# Patient Record
Sex: MALE | Race: WHITE | NOT HISPANIC OR LATINO | Employment: OTHER | URBAN - METROPOLITAN AREA
[De-identification: names, ages, dates, MRNs, and addresses within clinical notes are randomized per-mention and may not be internally consistent; named-entity substitution may affect disease eponyms.]

---

## 2017-01-19 RX ORDER — ESCITALOPRAM OXALATE 10 MG/1
TABLET ORAL
Qty: 30 TABLET | Refills: 6 | Status: SHIPPED | OUTPATIENT
Start: 2017-01-19 | End: 2017-08-17 | Stop reason: SDUPTHER

## 2017-05-29 ENCOUNTER — LAB VISIT (OUTPATIENT)
Dept: LAB | Facility: HOSPITAL | Age: 82
End: 2017-05-29
Attending: INTERNAL MEDICINE
Payer: MEDICARE

## 2017-05-29 DIAGNOSIS — E78.5 HYPERLIPIDEMIA ASSOCIATED WITH TYPE 2 DIABETES MELLITUS: ICD-10-CM

## 2017-05-29 DIAGNOSIS — Z00.00 ROUTINE GENERAL MEDICAL EXAMINATION AT A HEALTH CARE FACILITY: ICD-10-CM

## 2017-05-29 DIAGNOSIS — E11.69 HYPERLIPIDEMIA ASSOCIATED WITH TYPE 2 DIABETES MELLITUS: ICD-10-CM

## 2017-05-29 DIAGNOSIS — E11.9 TYPE 2 DIABETES MELLITUS WITHOUT COMPLICATION: ICD-10-CM

## 2017-05-29 LAB
ALBUMIN SERPL BCP-MCNC: 3.6 G/DL
ALP SERPL-CCNC: 94 U/L
ALT SERPL W/O P-5'-P-CCNC: 15 U/L
ANION GAP SERPL CALC-SCNC: 8 MMOL/L
AST SERPL-CCNC: 18 U/L
BASOPHILS # BLD AUTO: 0.03 K/UL
BASOPHILS NFR BLD: 0.5 %
BILIRUB SERPL-MCNC: 1.4 MG/DL
BUN SERPL-MCNC: 11 MG/DL
CALCIUM SERPL-MCNC: 8.9 MG/DL
CHLORIDE SERPL-SCNC: 106 MMOL/L
CHOLEST/HDLC SERPL: 2.9 {RATIO}
CO2 SERPL-SCNC: 27 MMOL/L
CREAT SERPL-MCNC: 0.9 MG/DL
DIFFERENTIAL METHOD: ABNORMAL
EOSINOPHIL # BLD AUTO: 0.2 K/UL
EOSINOPHIL NFR BLD: 3 %
ERYTHROCYTE [DISTWIDTH] IN BLOOD BY AUTOMATED COUNT: 13.1 %
EST. GFR  (AFRICAN AMERICAN): >60 ML/MIN/1.73 M^2
EST. GFR  (NON AFRICAN AMERICAN): >60 ML/MIN/1.73 M^2
GLUCOSE SERPL-MCNC: 119 MG/DL
HCT VFR BLD AUTO: 38.5 %
HDL/CHOLESTEROL RATIO: 34.5 %
HDLC SERPL-MCNC: 119 MG/DL
HDLC SERPL-MCNC: 41 MG/DL
HGB BLD-MCNC: 12.7 G/DL
LDLC SERPL CALC-MCNC: 51.4 MG/DL
LYMPHOCYTES # BLD AUTO: 1.6 K/UL
LYMPHOCYTES NFR BLD: 24.1 %
MCH RBC QN AUTO: 31.7 PG
MCHC RBC AUTO-ENTMCNC: 33 %
MCV RBC AUTO: 96 FL
MONOCYTES # BLD AUTO: 0.5 K/UL
MONOCYTES NFR BLD: 7 %
NEUTROPHILS # BLD AUTO: 4.2 K/UL
NEUTROPHILS NFR BLD: 65.2 %
NONHDLC SERPL-MCNC: 78 MG/DL
PLATELET # BLD AUTO: 161 K/UL
PMV BLD AUTO: 10.6 FL
POTASSIUM SERPL-SCNC: 4.3 MMOL/L
PROT SERPL-MCNC: 6.3 G/DL
RBC # BLD AUTO: 4.01 M/UL
SODIUM SERPL-SCNC: 141 MMOL/L
TRIGL SERPL-MCNC: 133 MG/DL
WBC # BLD AUTO: 6.43 K/UL

## 2017-05-29 PROCEDURE — 85025 COMPLETE CBC W/AUTO DIFF WBC: CPT

## 2017-05-29 PROCEDURE — 36415 COLL VENOUS BLD VENIPUNCTURE: CPT | Mod: PO

## 2017-05-29 PROCEDURE — 83036 HEMOGLOBIN GLYCOSYLATED A1C: CPT

## 2017-05-29 PROCEDURE — 80053 COMPREHEN METABOLIC PANEL: CPT

## 2017-05-29 PROCEDURE — 80061 LIPID PANEL: CPT

## 2017-05-30 LAB
ESTIMATED AVG GLUCOSE: 123 MG/DL
HBA1C MFR BLD HPLC: 5.9 %

## 2017-06-08 ENCOUNTER — PATIENT OUTREACH (OUTPATIENT)
Dept: ADMINISTRATIVE | Facility: HOSPITAL | Age: 82
End: 2017-06-08

## 2017-06-12 ENCOUNTER — OFFICE VISIT (OUTPATIENT)
Dept: OPHTHALMOLOGY | Facility: CLINIC | Age: 82
End: 2017-06-12
Payer: MEDICARE

## 2017-06-12 DIAGNOSIS — E11.9 DIABETES MELLITUS TYPE 2 WITHOUT RETINOPATHY: Primary | ICD-10-CM

## 2017-06-12 DIAGNOSIS — H35.3190 MACULAR DEGENERATION, AGE RELATED, NONEXUDATIVE: ICD-10-CM

## 2017-06-12 DIAGNOSIS — Z96.1 PSEUDOPHAKIA, BOTH EYES: ICD-10-CM

## 2017-06-12 PROCEDURE — 99999 PR PBB SHADOW E&M-EST. PATIENT-LVL II: CPT | Mod: PBBFAC,,, | Performed by: OPHTHALMOLOGY

## 2017-06-12 PROCEDURE — 92014 COMPRE OPH EXAM EST PT 1/>: CPT | Mod: S$GLB,,, | Performed by: OPHTHALMOLOGY

## 2017-06-12 PROCEDURE — 99499 UNLISTED E&M SERVICE: CPT | Mod: S$GLB,,, | Performed by: OPHTHALMOLOGY

## 2017-06-12 NOTE — PROGRESS NOTES
SUBJECTIVE:   Wilder Alejandra is a 89 y.o. male   Corrected distance visual acuity was 20/20 -2 in the right eye and 20/20 -2 in the left eye.   Chief Complaint   Patient presents with    Diabetic Eye Exam     pt states here for yearly DFE.pt states no complaints va is good no need for new RX    Macular Degeneration        HPI:  HPI     Diabetic Eye Exam    Additional comments: pt states here for yearly DFE.pt states no   complaints va is good no need for new RX           Comments     Am reading      1. DM x 2000  2. Mild Dry AMD  3. PCIOL OU before 2003  YAG OD 9/23/04  YAG OS before 2003  4. H/o Diplopia (esotropia)       Last edited by Caterina Ferguson MA on 6/12/2017  8:03 AM. (History)        Assessment /Plan :  1. Diabetes mellitus type 2 without retinopathy No diabetic retinopathy at this time. Reviewed diabetic eye precautions including avoiding tobacco use,  Good glucose control, and importance of regular follow up.    2. Macular degeneration, age related, nonexudative - Both Eyes stable   3. Pseudophakia, both eyes stable     RTC in 1 year or prn any changes

## 2017-06-21 ENCOUNTER — OFFICE VISIT (OUTPATIENT)
Dept: INTERNAL MEDICINE | Facility: CLINIC | Age: 82
End: 2017-06-21
Payer: MEDICARE

## 2017-06-21 VITALS
BODY MASS INDEX: 22.12 KG/M2 | TEMPERATURE: 96 F | DIASTOLIC BLOOD PRESSURE: 74 MMHG | HEART RATE: 69 BPM | OXYGEN SATURATION: 96 % | HEIGHT: 68 IN | WEIGHT: 145.94 LBS | SYSTOLIC BLOOD PRESSURE: 118 MMHG

## 2017-06-21 DIAGNOSIS — E11.59 HYPERTENSION ASSOCIATED WITH DIABETES: Chronic | ICD-10-CM

## 2017-06-21 DIAGNOSIS — I15.2 HYPERTENSION ASSOCIATED WITH DIABETES: Chronic | ICD-10-CM

## 2017-06-21 DIAGNOSIS — M81.0 OSTEOPOROSIS, UNSPECIFIED OSTEOPOROSIS TYPE, UNSPECIFIED PATHOLOGICAL FRACTURE PRESENCE: ICD-10-CM

## 2017-06-21 DIAGNOSIS — I77.1 TORTUOUS AORTA: ICD-10-CM

## 2017-06-21 DIAGNOSIS — Z00.00 ROUTINE GENERAL MEDICAL EXAMINATION AT A HEALTH CARE FACILITY: Primary | ICD-10-CM

## 2017-06-21 DIAGNOSIS — E11.9 TYPE 2 DIABETES MELLITUS WITHOUT COMPLICATION, WITHOUT LONG-TERM CURRENT USE OF INSULIN: ICD-10-CM

## 2017-06-21 DIAGNOSIS — E78.5 HYPERLIPIDEMIA ASSOCIATED WITH TYPE 2 DIABETES MELLITUS: Chronic | ICD-10-CM

## 2017-06-21 DIAGNOSIS — E11.69 HYPERLIPIDEMIA ASSOCIATED WITH TYPE 2 DIABETES MELLITUS: Chronic | ICD-10-CM

## 2017-06-21 PROCEDURE — 99499 UNLISTED E&M SERVICE: CPT | Mod: S$GLB,,, | Performed by: INTERNAL MEDICINE

## 2017-06-21 PROCEDURE — 99999 PR PBB SHADOW E&M-EST. PATIENT-LVL III: CPT | Mod: PBBFAC,,, | Performed by: INTERNAL MEDICINE

## 2017-06-21 PROCEDURE — 99397 PER PM REEVAL EST PAT 65+ YR: CPT | Mod: S$GLB,,, | Performed by: INTERNAL MEDICINE

## 2017-06-21 NOTE — PROGRESS NOTES
"Subjective:      Patient ID: Wilder Alejandra is a 89 y.o. male.    Chief Complaint: Annual Exam    89 yo with Patient Active Problem List:     Refractive error     Type 2 diabetes mellitus without complication     Macular degeneration, age related, nonexudative - Both Eyes     Lens replaced by other means - Both Eyes     CAD (coronary artery disease)     Ectropion of left eye     Pseudophakia     Hyperlipidemia associated with type 2 diabetes mellitus     Hypertension associated with diabetes     Osteoporosis     Tortuous aorta     Diabetes mellitus type 2 without retinopathy     Pseudophakia, both eyes    Here today for annual prev exam.  Compliant with meds without significant side effects. Feeling in usu state of health. Appetite is good. Up to date with specialty clinics. Quit tob in his 30s.       Review of Systems   Constitutional: Negative for chills and fever.   HENT: Negative for ear pain and sore throat.    Respiratory: Negative for cough.    Cardiovascular: Negative for chest pain.   Gastrointestinal: Negative for abdominal pain and blood in stool.   Genitourinary: Negative for dysuria and hematuria.   Neurological: Negative for seizures and syncope.     Objective:   /74 (BP Location: Right arm, Patient Position: Sitting)   Pulse 69   Temp 96 °F (35.6 °C) (Tympanic)   Ht 5' 8" (1.727 m)   Wt 66.2 kg (145 lb 15.1 oz)   SpO2 96%   BMI 22.19 kg/m²     Physical Exam   Constitutional: He is oriented to person, place, and time. He appears well-developed and well-nourished. No distress.   HENT:   Head: Normocephalic and atraumatic.   Mouth/Throat: Oropharynx is clear and moist.   Eyes: EOM are normal. Pupils are equal, round, and reactive to light.   Neck: Neck supple. No thyromegaly present.   Cardiovascular: Normal rate and regular rhythm.    Pulses:       Posterior tibial pulses are 2+ on the right side, and 2+ on the left side.   Pulmonary/Chest: Breath sounds normal. He has no wheezes. He has " no rales.   Abdominal: Soft. Bowel sounds are normal. There is no tenderness.   Feet:   Right Foot:   Protective Sensation: 8 sites tested. 8 sites sensed.   Skin Integrity: Negative for ulcer or blister.   Left Foot:   Protective Sensation: 8 sites tested. 8 sites sensed.   Skin Integrity: Negative for ulcer or blister.   Lymphadenopathy:     He has no cervical adenopathy.   Neurological: He is alert and oriented to person, place, and time.   Skin: Skin is warm and dry.   Psychiatric: He has a normal mood and affect. His behavior is normal.       Assessment:     1. Routine general medical examination at a health care facility    2. Hyperlipidemia associated with type 2 diabetes mellitus    3. Hypertension associated with diabetes    4. Type 2 diabetes mellitus without complication, without long-term current use of insulin    5. Osteoporosis, unspecified osteoporosis type, unspecified pathological fracture presence    6. Tortuous aorta      Plan:   Routine general medical examination at a health care facility  Heart healthy diet and reg exercise  HM reviewed with pt    Hyperlipidemia associated with type 2 diabetes mellitus  -     Lipid panel; Future; Expected date: 06/21/2018  -     TSH; Future; Expected date: 06/21/2018    Hypertension associated with diabetes  -     Comprehensive metabolic panel; Future; Expected date: 06/21/2018  -     CBC auto differential; Future; Expected date: 06/21/2018    Type 2 diabetes mellitus without complication, without long-term current use of insulin  -     Hemoglobin A1c; Future; Expected date: 06/21/2018    Osteoporosis, unspecified osteoporosis type, unspecified pathological fracture presence  -     DXA Bone Density Spine And Hip; Future; Expected date: 06/21/2017    Tortuous aorta        1. Hypertension associated with diabetes  Stable. On Nadolol. Continue current treatment plan   2. Coronary artery disease involving native coronary artery of native heart without angina  pectoris  Stable. Continue current treatment plan as previously prescribed Dr. Montague.  3. Type 2 diabetes mellitus without complication, without long-term current use of insulin  Stable. Managed with diet, exercise. Continue current treatment plan   4. Hyperlipidemia associated with type 2 diabetes mellitus  Stable. On Atorvastatinper cards  5. Osteoporosis  Dexa 5/9/13/ . Taking Calcium, Vit D.  pt denies h/o bisphophonate. Check dxa next avail. Pt agreeable to bisphosphonate if indicated   6. Tortuous aorta  Chest x-ray 9/10/10  Stable.    7. Macular degeneration, age related, nonexudative - Both Eyes  Stable. Continue current treatment plan as previously prescribed with your ophthalmologist, Dr. Yadav.      No Follow-up on file.

## 2017-06-23 RX ORDER — LANCETS
1 EACH MISCELLANEOUS DAILY
Qty: 100 EACH | Refills: 1 | Status: SHIPPED | OUTPATIENT
Start: 2017-06-23

## 2017-06-23 RX ORDER — DEXTROSE 4 G
TABLET,CHEWABLE ORAL
Qty: 1 EACH | Refills: 0 | Status: SHIPPED | OUTPATIENT
Start: 2017-06-23 | End: 2019-09-03

## 2017-07-11 RX ORDER — ATORVASTATIN CALCIUM 80 MG/1
TABLET, FILM COATED ORAL
Qty: 30 TABLET | Refills: 5 | Status: SHIPPED | OUTPATIENT
Start: 2017-07-11 | End: 2018-07-02 | Stop reason: SDUPTHER

## 2017-07-12 ENCOUNTER — APPOINTMENT (OUTPATIENT)
Dept: RADIOLOGY | Facility: CLINIC | Age: 82
End: 2017-07-12
Attending: INTERNAL MEDICINE
Payer: MEDICARE

## 2017-07-12 DIAGNOSIS — M81.0 OSTEOPOROSIS, UNSPECIFIED OSTEOPOROSIS TYPE, UNSPECIFIED PATHOLOGICAL FRACTURE PRESENCE: ICD-10-CM

## 2017-07-12 PROCEDURE — 77080 DXA BONE DENSITY AXIAL: CPT | Mod: TC,PO

## 2017-07-12 PROCEDURE — 77080 DXA BONE DENSITY AXIAL: CPT | Mod: 26,,, | Performed by: INTERNAL MEDICINE

## 2017-08-17 RX ORDER — ESCITALOPRAM OXALATE 10 MG/1
TABLET ORAL
Qty: 90 TABLET | Refills: 1 | Status: SHIPPED | OUTPATIENT
Start: 2017-08-17 | End: 2017-09-13 | Stop reason: SDUPTHER

## 2017-08-30 ENCOUNTER — PATIENT OUTREACH (OUTPATIENT)
Dept: ADMINISTRATIVE | Facility: HOSPITAL | Age: 82
End: 2017-08-30

## 2017-09-13 ENCOUNTER — OFFICE VISIT (OUTPATIENT)
Dept: INTERNAL MEDICINE | Facility: CLINIC | Age: 82
End: 2017-09-13
Payer: MEDICARE

## 2017-09-13 VITALS
SYSTOLIC BLOOD PRESSURE: 132 MMHG | OXYGEN SATURATION: 98 % | HEART RATE: 63 BPM | HEIGHT: 68 IN | BODY MASS INDEX: 21.85 KG/M2 | DIASTOLIC BLOOD PRESSURE: 72 MMHG | WEIGHT: 144.19 LBS | TEMPERATURE: 96 F

## 2017-09-13 DIAGNOSIS — F41.9 ANXIETY: Primary | ICD-10-CM

## 2017-09-13 DIAGNOSIS — R46.89 COMPULSIVE BEHAVIORS: ICD-10-CM

## 2017-09-13 DIAGNOSIS — R46.81 OBSESSIVE BEHAVIOR: ICD-10-CM

## 2017-09-13 PROCEDURE — 99213 OFFICE O/P EST LOW 20 MIN: CPT | Mod: S$GLB,,, | Performed by: INTERNAL MEDICINE

## 2017-09-13 PROCEDURE — 99999 PR PBB SHADOW E&M-EST. PATIENT-LVL III: CPT | Mod: PBBFAC,,, | Performed by: INTERNAL MEDICINE

## 2017-09-13 PROCEDURE — 3008F BODY MASS INDEX DOCD: CPT | Mod: S$GLB,,, | Performed by: INTERNAL MEDICINE

## 2017-09-13 PROCEDURE — 1126F AMNT PAIN NOTED NONE PRSNT: CPT | Mod: S$GLB,,, | Performed by: INTERNAL MEDICINE

## 2017-09-13 PROCEDURE — 1159F MED LIST DOCD IN RCRD: CPT | Mod: S$GLB,,, | Performed by: INTERNAL MEDICINE

## 2017-09-13 RX ORDER — ESCITALOPRAM OXALATE 10 MG/1
10 TABLET ORAL DAILY
Qty: 90 TABLET | Refills: 1 | Status: SHIPPED | OUTPATIENT
Start: 2017-09-13 | End: 2019-07-17

## 2017-09-13 NOTE — PROGRESS NOTES
"Subjective:      Patient ID: Wilder Alejandra is a 89 y.o. male.    Chief Complaint: Anxiety    89 yo with Patient Active Problem List:     Refractive error     Type 2 diabetes mellitus without complication     Macular degeneration, age related, nonexudative - Both Eyes     Lens replaced by other means - Both Eyes     CAD (coronary artery disease)     Ectropion of left eye     Pseudophakia     Hyperlipidemia associated with type 2 diabetes mellitus     Hypertension associated with diabetes     Osteoporosis     Tortuous aorta     Diabetes mellitus type 2 without retinopathy     Pseudophakia, both eyes    Here today with his daughter , Rebecca, concerned about worsening anxiety. Pt states, "I make a big deal out of everything"  Worries about things that don't need to be worried about. Especially Scheduling. Pt states he has some occasional issues with short term memory.  However he continues to manage his finances well.  He had his calendar with him with accurate notations for 2 events planned for next week.  His daughter feels he is obsessive about  reviewing his schedule often. Mult times daily. He has had some compulsive behavior even in younger years.   Family states worries a lot even though he has nothing to worry about, has good finances.   Plays tennis once weekly which he enjoys.  Does not care for activities at assisted living that are available to him.  He has never been interested in these activities even in his younger days.  He denies depression.      Review of Systems   Constitutional: Negative for chills and fever.   HENT: Negative for ear pain and sore throat.    Respiratory: Negative for cough.    Cardiovascular: Negative for chest pain.   Gastrointestinal: Negative for abdominal pain and blood in stool.   Genitourinary: Negative for dysuria and hematuria.   Neurological: Negative for seizures and syncope.     Objective:   BP (!) 140/60 (BP Location: Right arm, Patient Position: Sitting)   Pulse 63   " "Temp 96.3 °F (35.7 °C) (Tympanic)   Ht 5' 8" (1.727 m)   Wt 65.4 kg (144 lb 2.9 oz)   SpO2 98%   BMI 21.92 kg/m²     Physical Exam   Constitutional: He is oriented to person, place, and time. He appears well-developed and well-nourished. No distress.   HENT:   Head: Normocephalic and atraumatic.   Mouth/Throat: Oropharynx is clear and moist.   Eyes: EOM are normal. Pupils are equal, round, and reactive to light.   Neck: Neck supple. No thyromegaly present.   Cardiovascular: Normal rate and regular rhythm.    Pulmonary/Chest: Breath sounds normal. He has no wheezes. He has no rales.   Abdominal: Soft. Bowel sounds are normal. There is no tenderness.   Musculoskeletal: He exhibits no edema.   Lymphadenopathy:     He has no cervical adenopathy.   Neurological: He is alert and oriented to person, place, and time.   Skin: Skin is warm and dry.   Psychiatric: He has a normal mood and affect. His behavior is normal.       Assessment:     1. Anxiety    2. Compulsive behaviors    3. Obsessive behavior      Plan:   Anxiety  -     escitalopram oxalate (LEXAPRO) 10 MG tablet; Take 1 tablet (10 mg total) by mouth once daily.  Dispense: 90 tablet; Refill: 1  -     Ambulatory referral to Social Work    Compulsive behaviors  -     Ambulatory referral to Social Work    Obsessive behavior  -     Ambulatory referral to Social Work        Lab Frequency Next Occurrence   Comprehensive metabolic panel Once 06/21/2018   CBC auto differential Once 06/21/2018   Hemoglobin A1c Once 06/21/2018   Lipid panel Once 06/21/2018   TSH Once 06/21/2018       Problem List Items Addressed This Visit     None      Visit Diagnoses     Anxiety    -  Primary    Relevant Medications    escitalopram oxalate (LEXAPRO) 10 MG tablet    Other Relevant Orders    Ambulatory referral to Social Work    Compulsive behaviors        Relevant Orders    Ambulatory referral to Social Work    Obsessive behavior        Relevant Orders    Ambulatory referral to Social " Work          Return in about 3 months (around 12/13/2017), or if symptoms worsen or fail to improve.

## 2017-10-25 ENCOUNTER — OFFICE VISIT (OUTPATIENT)
Dept: INTERNAL MEDICINE | Facility: CLINIC | Age: 82
End: 2017-10-25
Payer: MEDICARE

## 2017-10-25 VITALS
BODY MASS INDEX: 21.75 KG/M2 | OXYGEN SATURATION: 93 % | WEIGHT: 143.5 LBS | DIASTOLIC BLOOD PRESSURE: 72 MMHG | HEIGHT: 68 IN | SYSTOLIC BLOOD PRESSURE: 127 MMHG | HEART RATE: 68 BPM

## 2017-10-25 DIAGNOSIS — R46.89 COMPULSIVE BEHAVIOR: ICD-10-CM

## 2017-10-25 DIAGNOSIS — E78.5 HYPERLIPIDEMIA ASSOCIATED WITH TYPE 2 DIABETES MELLITUS: Chronic | ICD-10-CM

## 2017-10-25 DIAGNOSIS — E11.9 DIABETES MELLITUS TYPE 2 WITHOUT RETINOPATHY: ICD-10-CM

## 2017-10-25 DIAGNOSIS — H35.3190 MACULAR DEGENERATION, AGE RELATED, NONEXUDATIVE: ICD-10-CM

## 2017-10-25 DIAGNOSIS — I25.10 CORONARY ARTERY DISEASE INVOLVING NATIVE CORONARY ARTERY OF NATIVE HEART WITHOUT ANGINA PECTORIS: ICD-10-CM

## 2017-10-25 DIAGNOSIS — I77.1 TORTUOUS AORTA: ICD-10-CM

## 2017-10-25 DIAGNOSIS — F41.9 ANXIETY: ICD-10-CM

## 2017-10-25 DIAGNOSIS — I15.2 HYPERTENSION ASSOCIATED WITH DIABETES: Chronic | ICD-10-CM

## 2017-10-25 DIAGNOSIS — M81.0 OSTEOPOROSIS, UNSPECIFIED OSTEOPOROSIS TYPE, UNSPECIFIED PATHOLOGICAL FRACTURE PRESENCE: ICD-10-CM

## 2017-10-25 DIAGNOSIS — E11.69 HYPERLIPIDEMIA ASSOCIATED WITH TYPE 2 DIABETES MELLITUS: Chronic | ICD-10-CM

## 2017-10-25 DIAGNOSIS — E11.59 HYPERTENSION ASSOCIATED WITH DIABETES: Chronic | ICD-10-CM

## 2017-10-25 DIAGNOSIS — Z00.00 ENCOUNTER FOR PREVENTIVE HEALTH EXAMINATION: Primary | ICD-10-CM

## 2017-10-25 DIAGNOSIS — H91.93 BILATERAL HEARING LOSS, UNSPECIFIED HEARING LOSS TYPE: ICD-10-CM

## 2017-10-25 PROCEDURE — G0439 PPPS, SUBSEQ VISIT: HCPCS | Mod: S$GLB,,, | Performed by: NURSE PRACTITIONER

## 2017-10-25 PROCEDURE — 99499 UNLISTED E&M SERVICE: CPT | Mod: S$GLB,,, | Performed by: NURSE PRACTITIONER

## 2017-10-25 PROCEDURE — 99999 PR PBB SHADOW E&M-EST. PATIENT-LVL IV: CPT | Mod: PBBFAC,,, | Performed by: NURSE PRACTITIONER

## 2017-10-25 NOTE — PROGRESS NOTES
"Wilder Alejandra presented for a  Medicare AWV and comprehensive Health Risk Assessment today. The following components were reviewed and updated:    · Medical history  · Family History  · Social history  · Allergies and Current Medications  · Health Risk Assessment  · Health Maintenance  · Care Team     ** See Completed Assessments for Annual Wellness Visit within the encounter summary.**       The following assessments were completed:  · Living Situation  · CAGE  · Depression Screening  · Timed Get Up and Go  · Whisper Test  · Cognitive Function Screening  · Nutrition Screening  · ADL Screening  · PAQ Screening    Vitals:    10/25/17 0950   BP: 127/72   BP Location: Left arm   Patient Position: Sitting   BP Method: Medium (Manual)   Pulse: 68   SpO2: (!) 93%   Weight: 65.1 kg (143 lb 8.3 oz)   Height: 5' 8" (1.727 m)     Body mass index is 21.82 kg/m².  Physical Exam   Constitutional: He appears well-developed. No distress.   HENT:   Head: Normocephalic and atraumatic.   Eyes: Pupils are equal, round, and reactive to light.       Neck: Carotid bruit is not present.   Cardiovascular: Normal rate, regular rhythm, normal heart sounds, intact distal pulses and normal pulses.  Exam reveals no gallop.    No murmur heard.  Pulmonary/Chest: Effort normal and breath sounds normal.   Abdominal: Soft. Bowel sounds are normal. He exhibits no distension. There is no tenderness.   Musculoskeletal: Normal range of motion. He exhibits no edema.   Neurological: He exhibits normal muscle tone. Gait normal.   Skin: Skin is warm, dry and intact.   Psychiatric: He has a normal mood and affect. His speech is normal and behavior is normal. Judgment and thought content normal. Cognition and memory are normal.   Nursing note and vitals reviewed.        Diagnoses and health risks identified today and associated recommendations/orders:    1. Encounter for preventive health examination    2. Coronary artery disease involving native coronary " artery of native heart without angina pectoris  S/P PTCA stent 1 yrs ago . Stable and controlled on Aspirin And Lipitor daily .Denies chest pain . Continue current treatment plan as previously prescribed with your cardiologist -.    3. Hypertension associated with diabetes  Stable and controlled on Nadolol daily. Continue current treatment plan as previously prescribed with your PCP- Jovon and cardiologist     4. Hyperlipidemia associated with type 2 diabetes mellitus  Component      Latest Ref Rng & Units 5/29/2017   Cholesterol      120 - 199 mg/dL 119 (L)   Triglycerides      30 - 150 mg/dL 133   HDL      40 - 75 mg/dL 41   LDL Cholesterol      63.0 - 159.0 mg/dL 51.4 (L)   HDL/Chol Ratio      20.0 - 50.0 % 34.5   Total Cholesterol/HDL Ratio      2.0 - 5.0 2.9   Stable and controlled on Lipitor daily . Continue current treatment plan as previously prescribed with your PCP and cardiologist .    5. Diabetes mellitus type 2 without retinopathy  Component      Latest Ref Rng & Units 5/29/2017 5/31/2016 6/9/2015 5/19/2014   Hemoglobin A1C      4.5 - 6.2 % 5.9 5.9 5.9 6.0     Component      Latest Ref Rng & Units 5/9/2013   Hemoglobin A1C      4.5 - 6.2 % 6.0   Stable and controlled on diet modifications and exercise . Continue current treatment plan as previously prescribed with your PCP.     6. Macular degeneration, age related, non exudative - Both Eyes  Chronic and stable vision .Continue current treatment plan as previously prescribed with your optholomogist .    7. Tortuous aorta   Chest xray  9/10/2010 THE AORTA IS MILDLY TORTUOUS.    Stable and controlled blood pressure and cholesterol level .Continue current treatment plan as previously prescribed with your PCP.     8. Osteoporosis, unspecified osteoporosis type, unspecified pathological fracture presence  DEXA 7/12/ 2017 due again in  2 yrs This problem is currently not controlled. Pt is not on a treatment plan. Please follow up with your PCP as  planned to discuss adjustments to your treatment plan.    9. Bilateral hearing loss, unspecified hearing loss type  Stable and controlled with  Bilateral hearing aides . Continue current treatment plan as previously prescribed with your PCP.     10 Anxiety  Stable and controlled on Lexapro- states anxiety decreased . Continue current treatment plan as previously prescribed with your PCP.     11. Compulsive behavior  Stable and controlled on Lexapro- states he does not over think anymore. Continue current treatment plan as previously prescribed with your PCP.       Provided Wilder with a 5-10 year written screening schedule and personal prevention plan. Recommendations were developed using the USPSTF age appropriate recommendations. Education, counseling, and referrals were provided as needed. After Visit Summary printed and given to patient which includes a list of additional screenings\tests needed.    1 Yearly  Follow   Melinda Louis NP

## 2017-10-25 NOTE — PATIENT INSTRUCTIONS
Counseling and Referral of Other Preventative  (Italic type indicates deductible and co-insurance are waived)    Patient Name: Wilder Alejandra  Today's Date: 10/25/2017      SERVICE LIMITATIONS RECOMMENDATION    Vaccines    · Pneumococcal (once after 65)    · Influenza (annually)    · Hepatitis B (if medium/high risk)    · Prevnar 13      Hepatitis B medium/high risk factors:       - End-stage renal disease       - Hemophiliacs who received Factor VII or         IX concentrates       - Clients of institutions for the mentally             retarded       - Persons who live in the same house as          a HepB carrier       - Homosexual men       - Illicit injectable drug abusers     Pneumococcal: Done, no repeat necessary     Influenza: Done, repeat in one year     Hepatitis B: N/A     Prevnar 13: Done, no repeat necessary    Prostate cancer screening (annually to age 75)     Prostate specific antigen (PSA) Shared decision making with Provider. Sometimes a co-pay may be required if the patient decides to have this test. The USPSTF no longer recommends prostate cancer screening routinely in medicine: not to follow    Colorectal cancer screening (to age 75)    · Fecal occult blood test (annual)  · Flexible sigmoidoscopy (5y)  · Screening colonoscopy (10y)  · Barium enema   N/A    Diabetes self-management training (no USPSTF recommendations)  Requires referral by treating physician for patient with diabetes or renal disease. 10 hours of initial DSMT sessions of no less than 30 minutes each in a continuous 12-month period. 2 hours of follow-up DSMT in subsequent years.  Recommended to patient, declined    Glaucoma screening (no USPSTF recommendation)  Diabetes mellitus, family history   , age 50 or over    American, age 65 or over  Done this year, repeat every year    Medical nutrition therapy for diabetes or renal disease (no recommended schedule)  Requires referral by treating physician for  patient with diabetes or renal disease or kidney transplant within the past 3 years.  Can be provided in same year as diabetes self-management training (DSMT), and CMS recommends medical nutrition therapy take place after DSMT. Up to 3 hours for initial year and 2 hours in subsequent years.  Recommended to patient, declined    Cardiovascular screening blood tests (every 5 years)  · Fasting lipid panel  Order as a panel if possible  Done this year, repeat every year    Diabetes screening tests (at least every 3 years, Medicare covers annually or at 6-month intervals for prediabetic patients)  · Fasting blood sugar (FBS) or glucose tolerance test (GTT)  Patient must be diagnosed with one of the following:       - Hypertension       - Dyslipidemia       - Obesity (BMI 30kg/m2)       - Previous elevated impaired FBS or GTT       ... or any two of the following:       - Overweight (BMI 25 but <30)       - Family history of diabetes       - Age 65 or older       - History of gestational diabetes or birth of baby weighing more than 9 pounds  Done this year, repeat every year    Abdominal aortic aneurysm screening (once)  · Sonogram   Limited to patients who meet one of the following criteria:       - Men who are 65-75 years old and have smoked more than 100 cigarette in their lifetime       - Anyone with a family history of abdominal aortic aneurysm       - Anyone recommended for screening by the USPSTF  N/A    HIV screening (annually for increased risk patients)  · HIV-1 and HIV-2 by EIA, or REJI, rapid antibody test or oral mucosa transudate  Patients must be at increased risk for HIV infection per USPSTF guidelines or pregnant. Tests covered annually for patient at increased risk or as requested by the patient. Pregnant patients may receive up to 3 tests during pregnancy.  Risks discussed, screening is not recommended    Smoking cessation counseling (up to 8 sessions per year)  Patients must be asymptomatic of  tobacco-related conditions to receive as a preventative service.  Non-smoker    Subsequent annual wellness visit  At least 12 months since last AWV  Return in one year     The following information is provided to all patients.  This information is to help you find resources for any of the problems found today that may be affecting your health:                Living healthy guide: www.[facility].[place].[facility]      Understanding Diabetes: www.diabetes.org      Eating healthy: www.cdc.gov/healthyweight      CDC home safety checklist: www.cdc.gov/steadi/patient.html      Agency on Aging: www.goea.[place].[facility]      Alcoholics anonymous (AA): www.aa.org      Physical Activity: www.venus.nih.gov/jc8etrp      Tobacco use: www.quitwithusla.org

## 2017-10-25 NOTE — Clinical Note
Your patient was seen today for a HRA visit.  I have included a copy of my visit note, please review the note and feel free to contact me with any questions.  Thank you for allowing me to participate in the care of your patients.  Melinda Louis NP

## 2017-10-25 NOTE — PROGRESS NOTES
"Wilder Alejandra presented for a  Medicare AWV and comprehensive Health Risk Assessment today. The following components were reviewed and updated:    · Medical history  · Family History  · Social history  · Allergies and Current Medications  · Health Risk Assessment  · Health Maintenance  · Care Team     ** See Completed Assessments for Annual Wellness Visit within the encounter summary.**       The following assessments were completed:  · Living Situation  · CAGE  · Depression Screening  · Timed Get Up and Go  · Whisper Test  · Cognitive Function Screening  · Nutrition Screening  · ADL Screening  · PAQ Screening    Vitals:    10/25/17 0950   BP: 127/72   BP Location: Left arm   Patient Position: Sitting   BP Method: Medium (Manual)   Pulse: 68   SpO2: (!) 93%   Weight: 65.1 kg (143 lb 8.3 oz)   Height: 5' 8" (1.727 m)     Body mass index is 21.82 kg/m².  Physical Exam      Diagnoses and health risks identified today and associated recommendations/orders:    1. Encounter for preventive health examination  ***    2. Coronary artery disease involving native coronary artery of native heart without angina pectoris  ***    3. Hypertension associated with diabetes  ***    4. Hyperlipidemia associated with type 2 diabetes mellitus  ***    5. Diabetes mellitus type 2 without retinopathy  ***    6. Macular degeneration, age related, nonexudative - Both Eyes  ***    7. Tortuous aorta  ***    8. Osteoporosis, unspecified osteoporosis type, unspecified pathological fracture presence  ***      Provided Wilder with a 5-10 year written screening schedule and personal prevention plan. Recommendations were developed using the USPSTF age appropriate recommendations. Education, counseling, and referrals were provided as needed. After Visit Summary printed and given to patient which includes a list of additional screenings\tests needed.    No Follow-up on file.    Melinda Louis NP  "

## 2017-10-25 NOTE — Clinical Note
Your patient was seen today for a HRA visit.  have included a copy of my visit note, please review the note and feel free to contact me with any questions.  Thank you for allowing me to participate in the care of your patients.  Melinda Louis NP

## 2017-11-29 ENCOUNTER — PATIENT OUTREACH (OUTPATIENT)
Dept: ADMINISTRATIVE | Facility: HOSPITAL | Age: 82
End: 2017-11-29

## 2017-11-29 ENCOUNTER — TELEPHONE (OUTPATIENT)
Dept: INTERNAL MEDICINE | Facility: CLINIC | Age: 82
End: 2017-11-29

## 2017-11-29 NOTE — TELEPHONE ENCOUNTER
----- Message from Delmis Sandoval sent at 11/29/2017  9:39 AM CST -----  Contact: pt  Please call pt @ [phone] regarding medication.

## 2017-11-29 NOTE — TELEPHONE ENCOUNTER
Pt stated he needs a refill of Lexapro 10 mg.  Notified pt that he is not due for a refill until at least 12/13/17 and that the refill is already on file at [facility].  Pt stated he tried calling [facility] to have them refill script and was told they could not.  I spoke with the pharmacist, Titus, who stated pt just picked up a 3-month supply of med on 10/31/17.  Notified pt of info and pt stated he only has 4 tabs left in bottle.  Asked pt if he has someone at home who helps to manage his meds.  Pt stated no.  Advised pt that he can request to  script early from pharmacy but that he will most likely have to pay out of pocket for it since it is being picked up too early.  Pt verbalized understanding.

## 2017-12-13 ENCOUNTER — OFFICE VISIT (OUTPATIENT)
Dept: INTERNAL MEDICINE | Facility: CLINIC | Age: 82
End: 2017-12-13
Payer: MEDICARE

## 2017-12-13 VITALS
DIASTOLIC BLOOD PRESSURE: 68 MMHG | TEMPERATURE: 96 F | HEIGHT: 68 IN | OXYGEN SATURATION: 95 % | HEART RATE: 82 BPM | WEIGHT: 144.19 LBS | BODY MASS INDEX: 21.85 KG/M2 | SYSTOLIC BLOOD PRESSURE: 120 MMHG

## 2017-12-13 DIAGNOSIS — F41.9 ANXIETY: Primary | ICD-10-CM

## 2017-12-13 DIAGNOSIS — E11.9 DIABETES MELLITUS TYPE 2 WITHOUT RETINOPATHY: ICD-10-CM

## 2017-12-13 PROCEDURE — 99499 UNLISTED E&M SERVICE: CPT | Mod: S$GLB,,, | Performed by: INTERNAL MEDICINE

## 2017-12-13 PROCEDURE — 99213 OFFICE O/P EST LOW 20 MIN: CPT | Mod: S$GLB,,, | Performed by: INTERNAL MEDICINE

## 2017-12-13 PROCEDURE — 99999 PR PBB SHADOW E&M-EST. PATIENT-LVL IV: CPT | Mod: PBBFAC,,, | Performed by: INTERNAL MEDICINE

## 2017-12-14 NOTE — PROGRESS NOTES
"Subjective:      Patient ID: Wilder Alejandra is a 89 y.o. male.    Chief Complaint: Follow-up    89 yo with Patient Active Problem List:     Refractive error     Macular degeneration, age related, nonexudative - Both Eyes     Lens replaced by other means - Both Eyes     CAD (coronary artery disease)     Ectropion of left eye     Pseudophakia     Hyperlipidemia associated with type 2 diabetes mellitus     Hypertension associated with diabetes     Osteoporosis     Tortuous aorta     Diabetes mellitus type 2 without retinopathy     Pseudophakia, both eyes     Bilateral hearing loss     Anxiety     Compulsive behavior    Here today for follow-up on anxiety.  He reports that he is feeling well today without any new complaints.  He reports that he took Lexapro for approximately one month.  He did feel some improvement on the medication.  However he reports that his pharmacy did not give him his full prescription and therefore he did not continue the medication.  He reports that he has not had Lexapro for over one mouth and is feeling well.  He does not feel that he has any significant anxiety or significant worry.      Review of Systems   Constitutional: Negative for chills and fever.   HENT: Negative for ear pain and sore throat.    Respiratory: Negative for cough.    Cardiovascular: Negative for chest pain.   Gastrointestinal: Negative for abdominal pain and blood in stool.   Genitourinary: Negative for dysuria and hematuria.   Neurological: Negative for seizures and syncope.   Psychiatric/Behavioral: Negative for confusion and dysphoric mood. The patient is not nervous/anxious.      Objective:   /68 (BP Location: Right arm, Patient Position: Sitting)   Pulse 82   Temp 96.2 °F (35.7 °C) (Tympanic)   Ht 5' 8" (1.727 m)   Wt 65.4 kg (144 lb 2.9 oz)   SpO2 95%   BMI 21.92 kg/m²     Physical Exam   Constitutional: He appears well-developed and well-nourished. No distress.   Cardiovascular: Normal rate.  "   Pulmonary/Chest: Effort normal and breath sounds normal.   Skin: Skin is warm and dry.   Psychiatric: He has a normal mood and affect. His behavior is normal.       Assessment:     1. Anxiety    2. Diabetes mellitus type 2 without retinopathy      Plan:   Anxiety  Stable    Diabetes mellitus type 2 without retinopathy  Stable    Other orders  -     Cancel: Hemoglobin A1c; Future; Expected date: 12/13/2017        Lab Frequency Next Occurrence   Comprehensive metabolic panel Once 06/21/2018   CBC auto differential Once 06/21/2018   Hemoglobin A1c Once 06/21/2018   Lipid panel Once 06/21/2018   TSH Once 06/21/2018       Problem List Items Addressed This Visit        Psychiatric    Anxiety - Primary       Endocrine    Diabetes mellitus type 2 without retinopathy          Return in about 6 months (around 6/13/2018), or if symptoms worsen or fail to improve.

## 2018-06-13 ENCOUNTER — LAB VISIT (OUTPATIENT)
Dept: LAB | Facility: HOSPITAL | Age: 83
End: 2018-06-13
Attending: INTERNAL MEDICINE
Payer: MEDICARE

## 2018-06-13 DIAGNOSIS — E11.9 TYPE 2 DIABETES MELLITUS WITHOUT COMPLICATION, WITHOUT LONG-TERM CURRENT USE OF INSULIN: ICD-10-CM

## 2018-06-13 DIAGNOSIS — I15.2 HYPERTENSION ASSOCIATED WITH DIABETES: Chronic | ICD-10-CM

## 2018-06-13 DIAGNOSIS — E11.59 HYPERTENSION ASSOCIATED WITH DIABETES: Chronic | ICD-10-CM

## 2018-06-13 DIAGNOSIS — E11.69 HYPERLIPIDEMIA ASSOCIATED WITH TYPE 2 DIABETES MELLITUS: Chronic | ICD-10-CM

## 2018-06-13 DIAGNOSIS — E78.5 HYPERLIPIDEMIA ASSOCIATED WITH TYPE 2 DIABETES MELLITUS: Chronic | ICD-10-CM

## 2018-06-13 LAB
ALBUMIN SERPL BCP-MCNC: 3.4 G/DL
ALP SERPL-CCNC: 97 U/L
ALT SERPL W/O P-5'-P-CCNC: 11 U/L
ANION GAP SERPL CALC-SCNC: 4 MMOL/L
AST SERPL-CCNC: 17 U/L
BASOPHILS # BLD AUTO: 0.04 K/UL
BASOPHILS NFR BLD: 0.6 %
BILIRUB SERPL-MCNC: 1.2 MG/DL
BUN SERPL-MCNC: 13 MG/DL
CALCIUM SERPL-MCNC: 8.8 MG/DL
CHLORIDE SERPL-SCNC: 108 MMOL/L
CHOLEST SERPL-MCNC: 110 MG/DL
CHOLEST/HDLC SERPL: 2.5 {RATIO}
CO2 SERPL-SCNC: 29 MMOL/L
CREAT SERPL-MCNC: 0.8 MG/DL
DIFFERENTIAL METHOD: ABNORMAL
EOSINOPHIL # BLD AUTO: 0.2 K/UL
EOSINOPHIL NFR BLD: 2.7 %
ERYTHROCYTE [DISTWIDTH] IN BLOOD BY AUTOMATED COUNT: 13.3 %
EST. GFR  (AFRICAN AMERICAN): >60 ML/MIN/1.73 M^2
EST. GFR  (NON AFRICAN AMERICAN): >60 ML/MIN/1.73 M^2
ESTIMATED AVG GLUCOSE: 120 MG/DL
GLUCOSE SERPL-MCNC: 115 MG/DL
HBA1C MFR BLD HPLC: 5.8 %
HCT VFR BLD AUTO: 37.9 %
HDLC SERPL-MCNC: 44 MG/DL
HDLC SERPL: 40 %
HGB BLD-MCNC: 12.3 G/DL
IMM GRANULOCYTES # BLD AUTO: 0.02 K/UL
IMM GRANULOCYTES NFR BLD AUTO: 0.3 %
LDLC SERPL CALC-MCNC: 47.4 MG/DL
LYMPHOCYTES # BLD AUTO: 1.8 K/UL
LYMPHOCYTES NFR BLD: 24.8 %
MCH RBC QN AUTO: 32.2 PG
MCHC RBC AUTO-ENTMCNC: 32.5 G/DL
MCV RBC AUTO: 99 FL
MONOCYTES # BLD AUTO: 0.6 K/UL
MONOCYTES NFR BLD: 7.8 %
NEUTROPHILS # BLD AUTO: 4.6 K/UL
NEUTROPHILS NFR BLD: 63.8 %
NONHDLC SERPL-MCNC: 66 MG/DL
NRBC BLD-RTO: 0 /100 WBC
PLATELET # BLD AUTO: 174 K/UL
PMV BLD AUTO: 10.2 FL
POTASSIUM SERPL-SCNC: 4.5 MMOL/L
PROT SERPL-MCNC: 5.9 G/DL
RBC # BLD AUTO: 3.82 M/UL
SODIUM SERPL-SCNC: 141 MMOL/L
TRIGL SERPL-MCNC: 93 MG/DL
TSH SERPL DL<=0.005 MIU/L-ACNC: 2.24 UIU/ML
WBC # BLD AUTO: 7.14 K/UL

## 2018-06-13 PROCEDURE — 80061 LIPID PANEL: CPT

## 2018-06-13 PROCEDURE — 84443 ASSAY THYROID STIM HORMONE: CPT

## 2018-06-13 PROCEDURE — 36415 COLL VENOUS BLD VENIPUNCTURE: CPT | Mod: PO

## 2018-06-13 PROCEDURE — 83036 HEMOGLOBIN GLYCOSYLATED A1C: CPT

## 2018-06-13 PROCEDURE — 85025 COMPLETE CBC W/AUTO DIFF WBC: CPT

## 2018-06-13 PROCEDURE — 80053 COMPREHEN METABOLIC PANEL: CPT

## 2018-06-18 ENCOUNTER — PES CALL (OUTPATIENT)
Dept: ADMINISTRATIVE | Facility: CLINIC | Age: 83
End: 2018-06-18

## 2018-07-02 RX ORDER — ATORVASTATIN CALCIUM 80 MG/1
TABLET, FILM COATED ORAL
Qty: 30 TABLET | Refills: 5 | Status: SHIPPED | OUTPATIENT
Start: 2018-07-02 | End: 2019-07-11 | Stop reason: SDUPTHER

## 2018-07-09 ENCOUNTER — OFFICE VISIT (OUTPATIENT)
Dept: OPHTHALMOLOGY | Facility: CLINIC | Age: 83
End: 2018-07-09
Payer: MEDICARE

## 2018-07-09 DIAGNOSIS — H35.3131 EARLY DRY STAGE NONEXUDATIVE AGE-RELATED MACULAR DEGENERATION OF BOTH EYES: ICD-10-CM

## 2018-07-09 DIAGNOSIS — Z96.1 PSEUDOPHAKIA, BOTH EYES: ICD-10-CM

## 2018-07-09 DIAGNOSIS — E11.9 DIABETES MELLITUS TYPE 2 WITHOUT RETINOPATHY: Primary | ICD-10-CM

## 2018-07-09 DIAGNOSIS — H02.105 ECTROPION OF LEFT LOWER EYELID, UNSPECIFIED ECTROPION TYPE: ICD-10-CM

## 2018-07-09 PROCEDURE — 99499 UNLISTED E&M SERVICE: CPT | Mod: S$GLB,,, | Performed by: OPHTHALMOLOGY

## 2018-07-09 PROCEDURE — 99999 PR PBB SHADOW E&M-EST. PATIENT-LVL II: CPT | Mod: PBBFAC,,, | Performed by: OPHTHALMOLOGY

## 2018-07-09 PROCEDURE — 92014 COMPRE OPH EXAM EST PT 1/>: CPT | Mod: S$GLB,,, | Performed by: OPHTHALMOLOGY

## 2018-07-09 NOTE — PROGRESS NOTES
SUBJECTIVE:   Wilder Alejandra is a 89 y.o. male   Corrected distance visual acuity was 20/20 -2 in the right eye and 20/20 -2 in the left eye.   Chief Complaint   Patient presents with    Diabetic Eye Exam     here for annual dilation pt states bs running really good va is also doing well no need to change glasses        HPI:  HPI     Diabetic Eye Exam    Additional comments: here for annual dilation pt states bs running really   good va is also doing well no need to change glasses           Comments   1. DM x 2000  2. Mild Dry AMD  3. PCIOL OU before 2003  YAG OD 9/23/04  YAG OS before 2003  4. H/o Diplopia (esotropia)       Last edited by Caterina Ferguson MA on 7/9/2018  8:24 AM. (History)        Assessment /Plan :  1. Diabetes mellitus type 2 without retinopathy No diabetic retinopathy at this time. Reviewed diabetic eye precautions including avoiding tobacco use,  Good glucose control, and importance of regular follow up.      2. Early dry stage nonexudative age-related macular degeneration of both eyes stable   3. Pseudophakia, both eyes stable   4. Ectropion of left lower eyelid, unspecified ectropion type monitor for now     RTC in 1 year or prn any changes

## 2018-10-15 ENCOUNTER — OFFICE VISIT (OUTPATIENT)
Dept: INTERNAL MEDICINE | Facility: CLINIC | Age: 83
End: 2018-10-15
Payer: MEDICARE

## 2018-10-15 ENCOUNTER — LAB VISIT (OUTPATIENT)
Dept: LAB | Facility: HOSPITAL | Age: 83
End: 2018-10-15
Attending: INTERNAL MEDICINE
Payer: MEDICARE

## 2018-10-15 VITALS
WEIGHT: 136.88 LBS | BODY MASS INDEX: 20.75 KG/M2 | OXYGEN SATURATION: 98 % | HEIGHT: 68 IN | HEART RATE: 77 BPM | TEMPERATURE: 98 F | SYSTOLIC BLOOD PRESSURE: 112 MMHG | DIASTOLIC BLOOD PRESSURE: 58 MMHG

## 2018-10-15 DIAGNOSIS — L98.9 SKIN LESIONS: ICD-10-CM

## 2018-10-15 DIAGNOSIS — I25.10 CORONARY ARTERY DISEASE INVOLVING NATIVE CORONARY ARTERY OF NATIVE HEART WITHOUT ANGINA PECTORIS: ICD-10-CM

## 2018-10-15 DIAGNOSIS — Z00.00 ROUTINE GENERAL MEDICAL EXAMINATION AT A HEALTH CARE FACILITY: Primary | ICD-10-CM

## 2018-10-15 DIAGNOSIS — E11.69 HYPERLIPIDEMIA ASSOCIATED WITH TYPE 2 DIABETES MELLITUS: Chronic | ICD-10-CM

## 2018-10-15 DIAGNOSIS — E78.5 HYPERLIPIDEMIA ASSOCIATED WITH TYPE 2 DIABETES MELLITUS: Chronic | ICD-10-CM

## 2018-10-15 DIAGNOSIS — E11.59 HYPERTENSION ASSOCIATED WITH DIABETES: Chronic | ICD-10-CM

## 2018-10-15 DIAGNOSIS — E11.9 DIABETES MELLITUS TYPE 2 WITHOUT RETINOPATHY: ICD-10-CM

## 2018-10-15 DIAGNOSIS — I15.2 HYPERTENSION ASSOCIATED WITH DIABETES: Chronic | ICD-10-CM

## 2018-10-15 LAB
ALBUMIN/CREAT UR: 6.6 UG/MG
CREAT UR-MCNC: 136 MG/DL
MICROALBUMIN UR DL<=1MG/L-MCNC: 9 UG/ML

## 2018-10-15 PROCEDURE — 99499 UNLISTED E&M SERVICE: CPT | Mod: S$GLB,,, | Performed by: INTERNAL MEDICINE

## 2018-10-15 PROCEDURE — 99397 PER PM REEVAL EST PAT 65+ YR: CPT | Mod: S$PBB,,, | Performed by: INTERNAL MEDICINE

## 2018-10-15 PROCEDURE — 99213 OFFICE O/P EST LOW 20 MIN: CPT | Mod: PBBFAC,PO | Performed by: INTERNAL MEDICINE

## 2018-10-15 PROCEDURE — 82043 UR ALBUMIN QUANTITATIVE: CPT

## 2018-10-15 PROCEDURE — 99999 PR PBB SHADOW E&M-EST. PATIENT-LVL III: CPT | Mod: PBBFAC,,, | Performed by: INTERNAL MEDICINE

## 2018-10-15 NOTE — PROGRESS NOTES
"Subjective:      Patient ID: Wilder Alejandra is a 89 y.o. male.    Chief Complaint: Follow-up    89 yo with Patient Active Problem List:     Refractive error     Macular degeneration, age related, nonexudative - Both Eyes     Lens replaced by other means - Both Eyes     CAD (coronary artery disease)     Ectropion of left eye     Pseudophakia     Hyperlipidemia associated with type 2 diabetes mellitus     Hypertension associated with diabetes     Osteoporosis     Tortuous aorta     Diabetes mellitus type 2 without retinopathy     Pseudophakia, both eyes     Bilateral hearing loss     Anxiety     Compulsive behavior    Past Medical History:  10/25/2017: Anxiety  No date: Arthritis  No date: Cataract  No date: Coronary artery disease  No date: Depression  No date: Diabetes mellitus  No date: Diverticulosis  No date: Hyperlipidemia  6/8/2016: Hypertension associated with diabetes  No date: Inguinal hernia, left  No date: Liver cyst  10/18/2016: Osteoporosis  06/01/2010: Renal cyst      Comment:  US retrop  No date: Strabismus  No date: Type 2 diabetes mellitus    Here today for annual prev exam.  Compliant with meds without significant side effects. Energy and appetite are good.  Mood much improved. Declines continued lexapro. He reports up to date with flu vaccine.       Review of Systems   Constitutional: Negative for chills and fever.   HENT: Negative for ear pain and sore throat.    Respiratory: Negative for cough.    Cardiovascular: Negative for chest pain.   Gastrointestinal: Negative for abdominal pain and blood in stool.   Genitourinary: Negative for dysuria and hematuria.   Neurological: Negative for seizures and syncope.     Objective:   BP (!) 112/58 (BP Location: Right arm, Patient Position: Sitting)   Pulse 77   Temp 97.5 °F (36.4 °C) (Tympanic)   Ht 5' 8" (1.727 m)   Wt 62.1 kg (136 lb 14.5 oz)   SpO2 98%   BMI 20.82 kg/m²     Physical Exam   Constitutional: He is oriented to person, place, and " time. He appears well-developed and well-nourished. No distress.   HENT:   Head: Normocephalic and atraumatic.   Mouth/Throat: Oropharynx is clear and moist.   Eyes: EOM are normal. Pupils are equal, round, and reactive to light.   Neck: Neck supple. No thyromegaly present.   Cardiovascular: Normal rate and regular rhythm.   Pulmonary/Chest: Breath sounds normal. He has no wheezes. He has no rales.   Abdominal: Soft. Bowel sounds are normal. There is no tenderness.   Musculoskeletal: He exhibits no edema.   Lymphadenopathy:     He has no cervical adenopathy.   Neurological: He is alert and oriented to person, place, and time.   Skin: Skin is warm and dry.   Psychiatric: He has a normal mood and affect. His behavior is normal.       Assessment:     1. Routine general medical examination at a health care facility    2. Diabetes mellitus type 2 without retinopathy    3. Coronary artery disease involving native coronary artery of native heart without angina pectoris    4. Hyperlipidemia associated with type 2 diabetes mellitus    5. Hypertension associated with diabetes    6. Skin lesions      Plan:   Routine general medical examination at a health care facility  Heart healthy diet and reg exercise    Diabetes mellitus type 2 without retinopathy  Controlled    -     Microalbumin/creatinine urine ratio; Future; Expected date: 10/15/2018    Coronary artery disease involving native coronary artery of native heart without angina pectoris  Stable    Hyperlipidemia associated with type 2 diabetes mellitus  Controlled    Hypertension associated with diabetes  Controlled    Sees [facility] for [facility].       Declined podiatry referral     Problem List Items Addressed This Visit        Cardiac/Vascular    CAD (coronary artery disease)    Hyperlipidemia associated with type 2 diabetes mellitus (Chronic)    Relevant Orders    Lipid panel    Hypertension associated with diabetes (Chronic)    Relevant Orders    CBC auto differential     Comprehensive metabolic panel    Hemoglobin A1c    TSH       Endocrine    Diabetes mellitus type 2 without retinopathy    Relevant Orders    Microalbumin/creatinine urine ratio      Other Visit Diagnoses     Routine general medical examination at a health care facility    -  Primary    Skin lesions        scalp  sees outside derm          Follow-up in about 6 months (around 4/15/2019), or if symptoms worsen or fail to improve.

## 2019-07-11 RX ORDER — ATORVASTATIN CALCIUM 80 MG/1
TABLET, FILM COATED ORAL
Qty: 30 TABLET | Refills: 5 | Status: SHIPPED | OUTPATIENT
Start: 2019-07-11

## 2019-07-17 ENCOUNTER — OFFICE VISIT (OUTPATIENT)
Dept: INTERNAL MEDICINE | Facility: CLINIC | Age: 84
End: 2019-07-17
Payer: MEDICARE

## 2019-07-17 ENCOUNTER — HOSPITAL ENCOUNTER (OUTPATIENT)
Dept: RADIOLOGY | Facility: HOSPITAL | Age: 84
Discharge: HOME | End: 2019-07-17
Attending: INTERNAL MEDICINE
Payer: MEDICARE

## 2019-07-17 VITALS
WEIGHT: 121.94 LBS | DIASTOLIC BLOOD PRESSURE: 62 MMHG | OXYGEN SATURATION: 92 % | HEART RATE: 81 BPM | SYSTOLIC BLOOD PRESSURE: 100 MMHG | TEMPERATURE: 97 F | BODY MASS INDEX: 18.54 KG/M2

## 2019-07-17 DIAGNOSIS — I15.2 HYPERTENSION ASSOCIATED WITH DIABETES: Primary | Chronic | ICD-10-CM

## 2019-07-17 DIAGNOSIS — I25.10 CORONARY ARTERY DISEASE INVOLVING NATIVE CORONARY ARTERY OF NATIVE HEART WITHOUT ANGINA PECTORIS: ICD-10-CM

## 2019-07-17 DIAGNOSIS — M81.0 OSTEOPOROSIS, UNSPECIFIED OSTEOPOROSIS TYPE, UNSPECIFIED PATHOLOGICAL FRACTURE PRESENCE: ICD-10-CM

## 2019-07-17 DIAGNOSIS — R35.1 NOCTURIA: ICD-10-CM

## 2019-07-17 DIAGNOSIS — E78.5 HYPERLIPIDEMIA ASSOCIATED WITH TYPE 2 DIABETES MELLITUS: Chronic | ICD-10-CM

## 2019-07-17 DIAGNOSIS — E11.59 HYPERTENSION ASSOCIATED WITH DIABETES: Primary | Chronic | ICD-10-CM

## 2019-07-17 DIAGNOSIS — E11.9 DIABETES MELLITUS TYPE 2 WITHOUT RETINOPATHY: ICD-10-CM

## 2019-07-17 DIAGNOSIS — R63.4 WEIGHT LOSS: ICD-10-CM

## 2019-07-17 DIAGNOSIS — E11.69 HYPERLIPIDEMIA ASSOCIATED WITH TYPE 2 DIABETES MELLITUS: Chronic | ICD-10-CM

## 2019-07-17 PROBLEM — F41.9 ANXIETY: Status: RESOLVED | Noted: 2017-10-25 | Resolved: 2019-07-17

## 2019-07-17 PROCEDURE — 71046 XR CHEST PA AND LATERAL: ICD-10-PCS | Mod: 26,HCNC,, | Performed by: RADIOLOGY

## 2019-07-17 PROCEDURE — 99999 PR PBB SHADOW E&M-EST. PATIENT-LVL IV: ICD-10-PCS | Mod: PBBFAC,HCNC,, | Performed by: INTERNAL MEDICINE

## 2019-07-17 PROCEDURE — 99999 PR PBB SHADOW E&M-EST. PATIENT-LVL IV: CPT | Mod: PBBFAC,HCNC,, | Performed by: INTERNAL MEDICINE

## 2019-07-17 PROCEDURE — 71046 X-RAY EXAM CHEST 2 VIEWS: CPT | Mod: TC,HCNC

## 2019-07-17 PROCEDURE — 71046 X-RAY EXAM CHEST 2 VIEWS: CPT | Mod: 26,HCNC,, | Performed by: RADIOLOGY

## 2019-07-17 PROCEDURE — 99214 PR OFFICE/OUTPT VISIT, EST, LEVL IV, 30-39 MIN: ICD-10-PCS | Mod: HCNC,S$GLB,, | Performed by: INTERNAL MEDICINE

## 2019-07-17 PROCEDURE — 1101F PR PT FALLS ASSESS DOC 0-1 FALLS W/OUT INJ PAST YR: ICD-10-PCS | Mod: HCNC,CPTII,S$GLB, | Performed by: INTERNAL MEDICINE

## 2019-07-17 PROCEDURE — 99214 OFFICE O/P EST MOD 30 MIN: CPT | Mod: HCNC,S$GLB,, | Performed by: INTERNAL MEDICINE

## 2019-07-17 PROCEDURE — 1101F PT FALLS ASSESS-DOCD LE1/YR: CPT | Mod: HCNC,CPTII,S$GLB, | Performed by: INTERNAL MEDICINE

## 2019-07-17 RX ORDER — ASPIRIN 325 MG
325 TABLET ORAL DAILY
COMMUNITY

## 2019-07-17 NOTE — PROGRESS NOTES
Subjective:      Patient ID: Wilder Alejandra is a 89 y.o. male.    Chief Complaint: Follow-up    HPI   89 yo with   Patient Active Problem List   Diagnosis    Refractive error    Macular degeneration, age related, nonexudative - Both Eyes    Lens replaced by other means - Both Eyes    CAD (coronary artery disease)    Ectropion of left eye    Pseudophakia    Hyperlipidemia associated with type 2 diabetes mellitus    Hypertension associated with diabetes    Osteoporosis    Tortuous aorta    Diabetes mellitus type 2 without retinopathy    Pseudophakia, both eyes    Bilateral hearing loss    Compulsive behavior     Past Medical History:   Diagnosis Date    Anxiety 10/25/2017    Anxiety 10/25/2017    Arthritis     Cataract     Coronary artery disease     Depression     Diabetes mellitus     Diverticulosis     Hyperlipidemia     Hypertension associated with diabetes 6/8/2016    Inguinal hernia, left     Liver cyst     Osteoporosis 10/18/2016    Renal cyst 06/01/2010     retrop    Strabismus     Type 2 diabetes mellitus      Here today for management of multiple medical problems as outlined below.  He reports that he is feeling well and in his usual state of health.  He is here today with his daughter.  His daughter brings up that patient has had a decreased appetite for several months.  The patient agrees with this.  He denies any dysphagia, odynophagia.  He denies early satiety.  He admits to some occasional nocturia.  No significant hesitancy    Review of Systems   Constitutional: Negative for chills, fatigue and fever.   HENT: Negative for ear pain and sore throat.    Eyes: Negative for visual disturbance.   Respiratory: Negative for cough, shortness of breath and wheezing.    Cardiovascular: Negative for chest pain and palpitations.   Gastrointestinal: Negative for abdominal pain, blood in stool, constipation, diarrhea, nausea and vomiting.   Genitourinary: Negative for dysuria and  hematuria.   Neurological: Negative for seizures and syncope.   Psychiatric/Behavioral: Negative for dysphoric mood. The patient is not nervous/anxious.      Objective:   /62 (BP Location: Left arm, Patient Position: Sitting, BP Method: Medium (Manual))   Pulse 81   Temp 97.1 °F (36.2 °C) (Tympanic)   Wt 55.3 kg (121 lb 14.6 oz)   SpO2 (!) 92%   BMI 18.54 kg/m²     Physical Exam   Constitutional: He is oriented to person, place, and time. He appears well-developed and well-nourished. No distress.   HENT:   Head: Normocephalic and atraumatic.   Mouth/Throat: Oropharynx is clear and moist.   Eyes: Pupils are equal, round, and reactive to light. EOM are normal.   Neck: Neck supple. No thyromegaly present.   Cardiovascular: Normal rate and regular rhythm.   Pulmonary/Chest: Breath sounds normal. He has no wheezes. He has no rales.   Abdominal: Soft. Bowel sounds are normal. There is no tenderness.   Musculoskeletal: He exhibits no edema.   Feet:   Right Foot:   Protective Sensation: 8 sites tested. 8 sites sensed.   Skin Integrity: Negative for ulcer or blister.   Left Foot:   Protective Sensation: 8 sites tested. 8 sites sensed.   Skin Integrity: Negative for ulcer or blister.   Lymphadenopathy:     He has no cervical adenopathy.   Neurological: He is alert and oriented to person, place, and time.   Skin: Skin is warm and dry.   Psychiatric: He has a normal mood and affect. His behavior is normal.       Assessment:     1. Hypertension associated with diabetes    2. Hyperlipidemia associated with type 2 diabetes mellitus    3. Coronary artery disease involving native coronary artery of native heart without angina pectoris    4. Osteoporosis, unspecified osteoporosis type, unspecified pathological fracture presence    5. Diabetes mellitus type 2 without retinopathy    6. Weight loss    7. Nocturia       Plan:   Hypertension associated with diabetes  Controlled.  Hyperlipidemia associated with type 2 diabetes  mellitus  Controlled.  Coronary artery disease involving native coronary artery of native heart without angina pectoris  Continue recommendations and follow-up as per Cardiology  Osteoporosis, unspecified osteoporosis type, unspecified pathological fracture presence  No history of treatment.  Check DEXA  -     DXA Bone Density Spine And Hip; Future; Expected date: 07/17/2019    Diabetes mellitus type 2 without retinopathy  Controlled  -     Hemoglobin A1c; Future; Expected date: 07/17/2019  -     Ambulatory Referral to Optometry  -     Hemoglobin A1c; Future; Expected date: 01/13/2020  -     CBC auto differential; Future; Expected date: 07/17/2019    Weight loss  -     X-Ray Chest PA And Lateral; Future; Expected date: 07/17/2019  -     Fecal Immunochemical Test (iFOBT); Future; Expected date: 07/17/2019  -     Sedimentation rate; Future; Expected date: 07/17/2019  -     Urinalysis; Future; Expected date: 07/17/2019  -     Hepatitis C antibody; Future; Expected date: 07/17/2019    Nocturia   -     PROSTATE SPECIFIC ANTIGEN, DIAGNOSTIC; Future; Expected date: 07/17/2019        Lab Frequency Next Occurrence   Comprehensive metabolic panel Once 04/13/2019   Lipid panel Once 04/13/2019   TSH Once 04/13/2019   Hemoglobin A1c Once 07/17/2019   Hemoglobin A1c Once 01/13/2020   DXA Bone Density Spine And Hip Once 07/17/2019   CBC auto differential Once 07/17/2019   Fecal Immunochemical Test (iFOBT) Once 07/17/2019   Sedimentation rate Once 07/17/2019   PROSTATE SPECIFIC ANTIGEN, DIAGNOSTIC Once 07/17/2019   Urinalysis Once 07/17/2019   Hepatitis C antibody Once 07/17/2019       Problem List Items Addressed This Visit        Cardiac/Vascular    CAD (coronary artery disease)    Hyperlipidemia associated with type 2 diabetes mellitus (Chronic)    Hypertension associated with diabetes - Primary (Chronic)       Endocrine    Diabetes mellitus type 2 without retinopathy    Relevant Orders    Hemoglobin A1c    Ambulatory Referral  to Optometry    Hemoglobin A1c    CBC auto differential       Orthopedic    Osteoporosis    Relevant Orders    DXA Bone Density Spine And Hip      Other Visit Diagnoses     Weight loss        Relevant Orders    X-Ray Chest PA And Lateral    Fecal Immunochemical Test (iFOBT)    Sedimentation rate    Urinalysis    Hepatitis C antibody    Nocturia         Relevant Orders    PROSTATE SPECIFIC ANTIGEN, DIAGNOSTIC          Follow up in about 2 months (around 9/17/2019), or if symptoms worsen or fail to improve.

## 2019-07-24 ENCOUNTER — LAB VISIT (OUTPATIENT)
Dept: LAB | Facility: HOSPITAL | Age: 84
End: 2019-07-24
Attending: INTERNAL MEDICINE
Payer: MEDICARE

## 2019-07-24 DIAGNOSIS — E78.5 HYPERLIPIDEMIA ASSOCIATED WITH TYPE 2 DIABETES MELLITUS: Chronic | ICD-10-CM

## 2019-07-24 DIAGNOSIS — E11.69 HYPERLIPIDEMIA ASSOCIATED WITH TYPE 2 DIABETES MELLITUS: Chronic | ICD-10-CM

## 2019-07-24 DIAGNOSIS — R35.1 NOCTURIA: ICD-10-CM

## 2019-07-24 DIAGNOSIS — E11.9 DIABETES MELLITUS TYPE 2 WITHOUT RETINOPATHY: ICD-10-CM

## 2019-07-24 DIAGNOSIS — E11.59 HYPERTENSION ASSOCIATED WITH DIABETES: Chronic | ICD-10-CM

## 2019-07-24 DIAGNOSIS — R63.4 WEIGHT LOSS: ICD-10-CM

## 2019-07-24 DIAGNOSIS — I15.2 HYPERTENSION ASSOCIATED WITH DIABETES: Chronic | ICD-10-CM

## 2019-07-24 LAB
ALBUMIN SERPL BCP-MCNC: 3.4 G/DL (ref 3.5–5.2)
ALP SERPL-CCNC: 98 U/L (ref 55–135)
ALT SERPL W/O P-5'-P-CCNC: 9 U/L (ref 10–44)
ANION GAP SERPL CALC-SCNC: 8 MMOL/L (ref 8–16)
AST SERPL-CCNC: 19 U/L (ref 10–40)
BASOPHILS # BLD AUTO: 0.02 K/UL (ref 0–0.2)
BASOPHILS NFR BLD: 0.2 % (ref 0–1.9)
BILIRUB SERPL-MCNC: 1.4 MG/DL (ref 0.1–1)
BUN SERPL-MCNC: 19 MG/DL (ref 10–30)
CALCIUM SERPL-MCNC: 9.7 MG/DL (ref 8.7–10.5)
CHLORIDE SERPL-SCNC: 106 MMOL/L (ref 95–110)
CHOLEST SERPL-MCNC: 106 MG/DL (ref 120–199)
CHOLEST/HDLC SERPL: 2.3 {RATIO} (ref 2–5)
CO2 SERPL-SCNC: 26 MMOL/L (ref 23–29)
COMPLEXED PSA SERPL-MCNC: 2 NG/ML (ref 0–4)
CREAT SERPL-MCNC: 0.8 MG/DL (ref 0.5–1.4)
DIFFERENTIAL METHOD: ABNORMAL
EOSINOPHIL # BLD AUTO: 0 K/UL (ref 0–0.5)
EOSINOPHIL NFR BLD: 0 % (ref 0–8)
ERYTHROCYTE [DISTWIDTH] IN BLOOD BY AUTOMATED COUNT: 16 % (ref 11.5–14.5)
ERYTHROCYTE [SEDIMENTATION RATE] IN BLOOD BY WESTERGREN METHOD: <2 MM/HR (ref 0–23)
EST. GFR  (AFRICAN AMERICAN): >60 ML/MIN/1.73 M^2
EST. GFR  (NON AFRICAN AMERICAN): >60 ML/MIN/1.73 M^2
ESTIMATED AVG GLUCOSE: 111 MG/DL (ref 68–131)
GLUCOSE SERPL-MCNC: 128 MG/DL (ref 70–110)
HBA1C MFR BLD HPLC: 5.5 % (ref 4–5.6)
HCT VFR BLD AUTO: 36.2 % (ref 40–54)
HDLC SERPL-MCNC: 46 MG/DL (ref 40–75)
HDLC SERPL: 43.4 % (ref 20–50)
HGB BLD-MCNC: 11.3 G/DL (ref 14–18)
IMM GRANULOCYTES # BLD AUTO: 0.02 K/UL (ref 0–0.04)
IMM GRANULOCYTES NFR BLD AUTO: 0.2 % (ref 0–0.5)
LDLC SERPL CALC-MCNC: 43.8 MG/DL (ref 63–159)
LYMPHOCYTES # BLD AUTO: 1.4 K/UL (ref 1–4.8)
LYMPHOCYTES NFR BLD: 12.9 % (ref 18–48)
MCH RBC QN AUTO: 30.2 PG (ref 27–31)
MCHC RBC AUTO-ENTMCNC: 31.2 G/DL (ref 32–36)
MCV RBC AUTO: 97 FL (ref 82–98)
MONOCYTES # BLD AUTO: 0.8 K/UL (ref 0.3–1)
MONOCYTES NFR BLD: 7.5 % (ref 4–15)
NEUTROPHILS # BLD AUTO: 8.4 K/UL (ref 1.8–7.7)
NEUTROPHILS NFR BLD: 79.2 % (ref 38–73)
NONHDLC SERPL-MCNC: 60 MG/DL
NRBC BLD-RTO: 0 /100 WBC
PLATELET # BLD AUTO: 280 K/UL (ref 150–350)
PMV BLD AUTO: 10.7 FL (ref 9.2–12.9)
POTASSIUM SERPL-SCNC: 5 MMOL/L (ref 3.5–5.1)
PROT SERPL-MCNC: 6 G/DL (ref 6–8.4)
RBC # BLD AUTO: 3.74 M/UL (ref 4.6–6.2)
SODIUM SERPL-SCNC: 140 MMOL/L (ref 136–145)
TRIGL SERPL-MCNC: 81 MG/DL (ref 30–150)
TSH SERPL DL<=0.005 MIU/L-ACNC: 2.05 UIU/ML (ref 0.4–4)
WBC # BLD AUTO: 10.56 K/UL (ref 3.9–12.7)

## 2019-07-24 PROCEDURE — 83036 HEMOGLOBIN GLYCOSYLATED A1C: CPT | Mod: HCNC

## 2019-07-24 PROCEDURE — 84443 ASSAY THYROID STIM HORMONE: CPT | Mod: HCNC

## 2019-07-24 PROCEDURE — 85025 COMPLETE CBC W/AUTO DIFF WBC: CPT | Mod: HCNC

## 2019-07-24 PROCEDURE — 85652 RBC SED RATE AUTOMATED: CPT | Mod: HCNC

## 2019-07-24 PROCEDURE — 80061 LIPID PANEL: CPT | Mod: HCNC

## 2019-07-24 PROCEDURE — 36415 COLL VENOUS BLD VENIPUNCTURE: CPT | Mod: HCNC

## 2019-07-24 PROCEDURE — 84153 ASSAY OF PSA TOTAL: CPT | Mod: HCNC

## 2019-07-24 PROCEDURE — 80053 COMPREHEN METABOLIC PANEL: CPT | Mod: HCNC

## 2019-07-24 PROCEDURE — 86803 HEPATITIS C AB TEST: CPT | Mod: HCNC

## 2019-07-25 LAB — HCV AB SERPL QL IA: NEGATIVE

## 2019-07-26 ENCOUNTER — TELEPHONE (OUTPATIENT)
Dept: INTERNAL MEDICINE | Facility: CLINIC | Age: 84
End: 2019-07-26

## 2019-07-26 ENCOUNTER — PATIENT OUTREACH (OUTPATIENT)
Dept: ADMINISTRATIVE | Facility: HOSPITAL | Age: 84
End: 2019-07-26

## 2019-07-26 DIAGNOSIS — D64.9 ANEMIA, UNSPECIFIED TYPE: ICD-10-CM

## 2019-07-26 DIAGNOSIS — R63.4 WEIGHT LOSS: Primary | ICD-10-CM

## 2019-07-26 NOTE — PROGRESS NOTES
Contacted patient to follow up on overdue fit kit. Patient states he doesn't remember discussing anything about a fit kit at his last visit and didn't receive one, but he doesn't mind getting one in the mail.

## 2019-07-29 NOTE — TELEPHONE ENCOUNTER
Conveyed result and recommendation to pt who verbalized understanding and scheduled a hematology appt with Dr. Pagan on 8/13/19.  Appt letter mailed to pt.

## 2019-07-31 ENCOUNTER — TELEPHONE (OUTPATIENT)
Dept: INTERNAL MEDICINE | Facility: CLINIC | Age: 84
End: 2019-07-31

## 2019-07-31 NOTE — TELEPHONE ENCOUNTER
----- Message from Yolande Soto sent at 7/31/2019 12:01 PM CDT -----  Contact: Daughter- Ms [phone]  Would like to consult with the nurse, Daughter has some question concerning the patient , daughter would like a call back as soon as possible, please call back at [phone], thanks sj

## 2019-07-31 NOTE — TELEPHONE ENCOUNTER
Pt's daughter, Rebecca, stated pt fell last night and hit his head.  Was taken to [facility].  CT scan, EKG, and blood work were all normal according to daughter.  Pt stated she is looking for a cause as to why pt fell.  Notified Rebecca that pt's recent chest x-ray was normal, UA showed no infection, but blood work showed worsening anemia for which pt was referred to hematology.  Notified Rebecca that pt scheduled an appt for 8/13/19 with Dr. Pagan.  Rebecca rescheduled appt to 8/6/19 with Dr. Elizalde.  Notified Rebecca that [facility] record will be requested.

## 2019-08-06 ENCOUNTER — LAB VISIT (OUTPATIENT)
Dept: LAB | Facility: HOSPITAL | Age: 84
End: 2019-08-06
Attending: INTERNAL MEDICINE
Payer: MEDICARE

## 2019-08-06 DIAGNOSIS — E53.8 B12 DEFICIENCY: ICD-10-CM

## 2019-08-06 DIAGNOSIS — D49.1 NEOPLASM OF LUNG: ICD-10-CM

## 2019-08-06 DIAGNOSIS — R63.4 UNINTENTIONAL WEIGHT LOSS: ICD-10-CM

## 2019-08-06 LAB
ALBUMIN SERPL BCP-MCNC: 3.7 G/DL (ref 3.5–5.2)
ALP SERPL-CCNC: 77 U/L (ref 55–135)
ALT SERPL W/O P-5'-P-CCNC: 13 U/L (ref 10–44)
ANION GAP SERPL CALC-SCNC: 13 MMOL/L (ref 8–16)
AST SERPL-CCNC: 21 U/L (ref 10–40)
BASOPHILS # BLD AUTO: 0.04 K/UL (ref 0–0.2)
BASOPHILS NFR BLD: 0.4 % (ref 0–1.9)
BILIRUB SERPL-MCNC: 1.1 MG/DL (ref 0.1–1)
BUN SERPL-MCNC: 20 MG/DL (ref 10–30)
CALCIUM SERPL-MCNC: 9.4 MG/DL (ref 8.7–10.5)
CHLORIDE SERPL-SCNC: 102 MMOL/L (ref 95–110)
CO2 SERPL-SCNC: 24 MMOL/L (ref 23–29)
CREAT SERPL-MCNC: 0.8 MG/DL (ref 0.5–1.4)
DIFFERENTIAL METHOD: ABNORMAL
EOSINOPHIL # BLD AUTO: 0.2 K/UL (ref 0–0.5)
EOSINOPHIL NFR BLD: 2.3 % (ref 0–8)
ERYTHROCYTE [DISTWIDTH] IN BLOOD BY AUTOMATED COUNT: 15.8 % (ref 11.5–14.5)
EST. GFR  (AFRICAN AMERICAN): >60 ML/MIN/1.73 M^2
EST. GFR  (NON AFRICAN AMERICAN): >60 ML/MIN/1.73 M^2
GLUCOSE SERPL-MCNC: 113 MG/DL (ref 70–110)
HCT VFR BLD AUTO: 38.1 % (ref 40–54)
HGB BLD-MCNC: 12 G/DL (ref 14–18)
IMM GRANULOCYTES # BLD AUTO: 0.04 K/UL (ref 0–0.04)
IMM GRANULOCYTES NFR BLD AUTO: 0.4 % (ref 0–0.5)
LDH SERPL L TO P-CCNC: 192 U/L (ref 110–260)
LYMPHOCYTES # BLD AUTO: 1.8 K/UL (ref 1–4.8)
LYMPHOCYTES NFR BLD: 20 % (ref 18–48)
MCH RBC QN AUTO: 29.7 PG (ref 27–31)
MCHC RBC AUTO-ENTMCNC: 31.5 G/DL (ref 32–36)
MCV RBC AUTO: 94 FL (ref 82–98)
MONOCYTES # BLD AUTO: 0.6 K/UL (ref 0.3–1)
MONOCYTES NFR BLD: 6.1 % (ref 4–15)
NEUTROPHILS # BLD AUTO: 6.5 K/UL (ref 1.8–7.7)
NEUTROPHILS NFR BLD: 71.2 % (ref 38–73)
NRBC BLD-RTO: 0 /100 WBC
PLATELET # BLD AUTO: 300 K/UL (ref 150–350)
PMV BLD AUTO: 9.9 FL (ref 9.2–12.9)
POTASSIUM SERPL-SCNC: 4.5 MMOL/L (ref 3.5–5.1)
PROT SERPL-MCNC: 6.6 G/DL (ref 6–8.4)
RBC # BLD AUTO: 4.04 M/UL (ref 4.6–6.2)
RETICS/RBC NFR AUTO: 1.5 % (ref 0.4–2)
SODIUM SERPL-SCNC: 139 MMOL/L (ref 136–145)
WBC # BLD AUTO: 9.07 K/UL (ref 3.9–12.7)

## 2019-08-06 PROCEDURE — 85025 COMPLETE CBC W/AUTO DIFF WBC: CPT | Mod: HCNC

## 2019-08-06 PROCEDURE — 83010 ASSAY OF HAPTOGLOBIN QUANT: CPT | Mod: HCNC

## 2019-08-06 PROCEDURE — 83615 LACTATE (LD) (LDH) ENZYME: CPT | Mod: HCNC

## 2019-08-06 PROCEDURE — 83540 ASSAY OF IRON: CPT | Mod: HCNC

## 2019-08-06 PROCEDURE — 84165 PROTEIN E-PHORESIS SERUM: CPT | Mod: HCNC

## 2019-08-06 PROCEDURE — 83520 IMMUNOASSAY QUANT NOS NONAB: CPT | Mod: HCNC

## 2019-08-06 PROCEDURE — 80053 COMPREHEN METABOLIC PANEL: CPT | Mod: HCNC

## 2019-08-06 PROCEDURE — 82728 ASSAY OF FERRITIN: CPT | Mod: HCNC

## 2019-08-06 PROCEDURE — 83880 ASSAY OF NATRIURETIC PEPTIDE: CPT | Mod: HCNC

## 2019-08-06 PROCEDURE — 84165 PATHOLOGIST INTERPRETATION SPE: ICD-10-PCS | Mod: 26,HCNC,, | Performed by: PATHOLOGY

## 2019-08-06 PROCEDURE — 82607 VITAMIN B-12: CPT | Mod: HCNC

## 2019-08-06 PROCEDURE — 84165 PROTEIN E-PHORESIS SERUM: CPT | Mod: 26,HCNC,, | Performed by: PATHOLOGY

## 2019-08-06 PROCEDURE — 36415 COLL VENOUS BLD VENIPUNCTURE: CPT | Mod: HCNC

## 2019-08-06 PROCEDURE — 85045 AUTOMATED RETICULOCYTE COUNT: CPT | Mod: HCNC

## 2019-08-07 LAB
BNP SERPL-MCNC: 233 PG/ML (ref 0–99)
FERRITIN SERPL-MCNC: 51 NG/ML (ref 20–300)
HAPTOGLOB SERPL-MCNC: 276 MG/DL (ref 30–250)
IRON SERPL-MCNC: 58 UG/DL (ref 45–160)
SATURATED IRON: 16 % (ref 20–50)
TOTAL IRON BINDING CAPACITY: 357 UG/DL (ref 250–450)
TRANSFERRIN SERPL-MCNC: 241 MG/DL (ref 200–375)
VIT B12 SERPL-MCNC: >2000 PG/ML (ref 210–950)

## 2019-08-08 LAB
ALBUMIN SERPL ELPH-MCNC: 3.4 G/DL (ref 3.35–5.55)
ALPHA1 GLOB SERPL ELPH-MCNC: 0.41 G/DL (ref 0.17–0.41)
ALPHA2 GLOB SERPL ELPH-MCNC: 0.98 G/DL (ref 0.43–0.99)
B-GLOBULIN SERPL ELPH-MCNC: 0.82 G/DL (ref 0.5–1.1)
GAMMA GLOB SERPL ELPH-MCNC: 0.7 G/DL (ref 0.67–1.58)
KAPPA LC SER QL IA: 1.34 MG/DL (ref 0.33–1.94)
KAPPA LC/LAMBDA SER IA: 1.07 (ref 0.26–1.65)
LAMBDA LC SER QL IA: 1.25 MG/DL (ref 0.57–2.63)
PATHOLOGIST INTERPRETATION SPE: NORMAL
PROT SERPL-MCNC: 6.3 G/DL (ref 6–8.4)

## 2019-08-13 ENCOUNTER — OFFICE VISIT (OUTPATIENT)
Dept: HEMATOLOGY/ONCOLOGY | Facility: CLINIC | Age: 84
End: 2019-08-13
Payer: MEDICARE

## 2019-08-13 VITALS
SYSTOLIC BLOOD PRESSURE: 128 MMHG | HEART RATE: 77 BPM | WEIGHT: 112.88 LBS | DIASTOLIC BLOOD PRESSURE: 56 MMHG | HEIGHT: 68 IN | BODY MASS INDEX: 17.11 KG/M2 | OXYGEN SATURATION: 96 % | TEMPERATURE: 98 F | RESPIRATION RATE: 18 BRPM

## 2019-08-13 DIAGNOSIS — R63.4 UNINTENTIONAL WEIGHT LOSS: Primary | ICD-10-CM

## 2019-08-13 DIAGNOSIS — K92.2 GASTROINTESTINAL BLEEDING, LOWER: ICD-10-CM

## 2019-08-13 PROCEDURE — 99999 PR PBB SHADOW E&M-EST. PATIENT-LVL IV: ICD-10-PCS | Mod: PBBFAC,HCNC,, | Performed by: INTERNAL MEDICINE

## 2019-08-13 PROCEDURE — 1101F PT FALLS ASSESS-DOCD LE1/YR: CPT | Mod: HCNC,CPTII,S$GLB, | Performed by: INTERNAL MEDICINE

## 2019-08-13 PROCEDURE — 99999 PR PBB SHADOW E&M-EST. PATIENT-LVL IV: CPT | Mod: PBBFAC,HCNC,, | Performed by: INTERNAL MEDICINE

## 2019-08-13 PROCEDURE — 1101F PR PT FALLS ASSESS DOC 0-1 FALLS W/OUT INJ PAST YR: ICD-10-PCS | Mod: HCNC,CPTII,S$GLB, | Performed by: INTERNAL MEDICINE

## 2019-08-13 PROCEDURE — 99214 PR OFFICE/OUTPT VISIT, EST, LEVL IV, 30-39 MIN: ICD-10-PCS | Mod: HCNC,S$GLB,, | Performed by: INTERNAL MEDICINE

## 2019-08-13 PROCEDURE — 99214 OFFICE O/P EST MOD 30 MIN: CPT | Mod: HCNC,S$GLB,, | Performed by: INTERNAL MEDICINE

## 2019-08-13 NOTE — PROGRESS NOTES
Distress Screening Results: Psychosocial Distress screening score of Distress Score: 3 {AMB ONC DISTRESS SCORE:15291}

## 2019-08-13 NOTE — PROGRESS NOTES
Subjective:       Patient ID: Wilder Alejandra is a 89 y.o. male.    Chief Complaint: Follow-up and Results ( unintentional weight loss)    HPI  89-year-old male history of abnormal weight loss patient returns for review of laboratory studies and further consideration information obtained from batteries general emergency room visit    Past Medical History:   Diagnosis Date    Anxiety 10/25/2017    Anxiety 10/25/2017    Arthritis     Cataract     Coronary artery disease     Depression     Diabetes mellitus     Diverticulosis     Hyperlipidemia     Hypertension associated with diabetes 2016    Inguinal hernia, left     Liver cyst     Osteoporosis 10/18/2016    Renal cyst 2010    US retrop    Strabismus     Type 2 diabetes mellitus      Family History   Problem Relation Age of Onset    No Known Problems Mother     No Known Problems Father     Strabismus Neg Hx     Retinal detachment Neg Hx     Macular degeneration Neg Hx     Glaucoma Neg Hx     Blindness Neg Hx     Amblyopia Neg Hx     Cancer Neg Hx     Cataracts Neg Hx     Diabetes Neg Hx     Hypertension Neg Hx     Stroke Neg Hx     Thyroid disease Neg Hx      Social History     Socioeconomic History    Marital status:      Spouse name: Not on file    Number of children: Not on file    Years of education: Not on file    Highest education level: Not on file   Occupational History    Not on file   Social Needs    Financial resource strain: Not on file    Food insecurity:     Worry: Not on file     Inability: Not on file    Transportation needs:     Medical: Not on file     Non-medical: Not on file   Tobacco Use    Smoking status: Former Smoker     Packs/day: 1.00     Years: 15.00     Pack years: 15.00     Last attempt to quit: 5     Years since quittin.6    Smokeless tobacco: Never Used   Substance and Sexual Activity    Alcohol use: Yes     Alcohol/week: 8.4 oz     Types: 7 Shots of liquor, 7 Glasses  of wine per week    Drug use: No    Sexual activity: Never   Lifestyle    Physical activity:     Days per week: Not on file     Minutes per session: Not on file    Stress: Not on file   Relationships    Social connections:     Talks on phone: Not on file     Gets together: Not on file     Attends Hoahaoism service: Not on file     Active member of club or organization: Not on file     Attends meetings of clubs or organizations: Not on file     Relationship status: Not on file   Other Topics Concern    Not on file   Social History Narrative    Not on file     Past Surgical History:   Procedure Laterality Date    CATARACT EXTRACTION Bilateral     heart stent surgery  1990s  YAG         Labs:  Lab Results   Component Value Date    WBC 9.07 08/06/2019    HGB 12.0 (L) 08/06/2019    HCT 38.1 (L) 08/06/2019    MCV 94 08/06/2019     08/06/2019     BMP  Lab Results   Component Value Date     08/06/2019    K 4.5 08/06/2019     08/06/2019    CO2 24 08/06/2019    BUN 20 08/06/2019    CREATININE 0.8 08/06/2019    CALCIUM 9.4 08/06/2019    ANIONGAP 13 08/06/2019    ESTGFRAFRICA >60 08/06/2019    EGFRNONAA >60 08/06/2019     Lab Results   Component Value Date    ALT 13 08/06/2019    AST 21 08/06/2019    ALKPHOS 77 08/06/2019    BILITOT 1.1 (H) 08/06/2019       Lab Results   Component Value Date    IRON 58 08/06/2019    TIBC 357 08/06/2019    FERRITIN 51 08/06/2019     Lab Results   Component Value Date    WUZIXAFH40 >2000 (H) 08/06/2019     Lab Results   Component Value Date    FOLATE 17.7 09/21/2009     Lab Results   Component Value Date    TSH 2.048 07/24/2019         Review of Systems   Constitutional: Positive for activity change, appetite change, fatigue and unexpected weight change. Negative for chills, diaphoresis and fever.   HENT: Negative for congestion, dental problem, drooling, ear discharge, ear pain, facial swelling, hearing loss, mouth sores, nosebleeds, postnasal drip, rhinorrhea,  sinus pressure, sneezing, sore throat, tinnitus, trouble swallowing and voice change.    Eyes: Negative for photophobia, pain, discharge, redness, itching and visual disturbance.   Respiratory: Negative for apnea, cough, choking, chest tightness, shortness of breath, wheezing and stridor.    Cardiovascular: Negative for chest pain, palpitations and leg swelling.   Gastrointestinal: Negative for abdominal distention, abdominal pain, anal bleeding, blood in stool, constipation, diarrhea, nausea, rectal pain and vomiting.   Endocrine: Negative for cold intolerance, heat intolerance, polydipsia, polyphagia and polyuria.   Genitourinary: Negative for decreased urine volume, difficulty urinating, discharge, dysuria, enuresis, flank pain, frequency, genital sores, hematuria, penile pain, penile swelling, scrotal swelling, testicular pain and urgency.   Musculoskeletal: Positive for gait problem. Negative for arthralgias, back pain, joint swelling, myalgias, neck pain and neck stiffness.   Skin: Negative for color change, pallor, rash and wound.   Allergic/Immunologic: Negative for environmental allergies, food allergies and immunocompromised state.   Neurological: Positive for weakness. Negative for dizziness, tremors, seizures, syncope, facial asymmetry, speech difficulty, light-headedness, numbness and headaches.   Hematological: Negative for adenopathy. Does not bruise/bleed easily.   Psychiatric/Behavioral: Positive for dysphoric mood. Negative for agitation, behavioral problems, confusion, decreased concentration, hallucinations, self-injury, sleep disturbance and suicidal ideas. The patient is nervous/anxious. The patient is not hyperactive.        Objective:      Physical Exam   Constitutional: He is oriented to person, place, and time. He appears cachectic. He has a sickly appearance. He appears ill. He appears distressed.   HENT:   Head: Normocephalic.   Right Ear: External ear normal.   Left Ear: External ear  normal.   Nose: Nose normal. Right sinus exhibits no maxillary sinus tenderness and no frontal sinus tenderness. Left sinus exhibits no maxillary sinus tenderness and no frontal sinus tenderness.   Mouth/Throat: Oropharynx is clear and moist. No oropharyngeal exudate.   Eyes: Pupils are equal, round, and reactive to light. EOM and lids are normal. Right eye exhibits no discharge. Left eye exhibits no discharge. Right conjunctiva is not injected. Right conjunctiva has no hemorrhage. Left conjunctiva is not injected. Left conjunctiva has no hemorrhage. No scleral icterus. Right eye exhibits normal extraocular motion. Left eye exhibits normal extraocular motion.   Neck: Normal range of motion. Neck supple. No JVD present. No tracheal deviation present. No thyromegaly present.   Cardiovascular: Normal rate and regular rhythm.   Pulmonary/Chest: Effort normal. No stridor. No respiratory distress.   Abdominal: Soft. He exhibits no mass. There is no hepatosplenomegaly, splenomegaly or hepatomegaly. There is no tenderness.   Musculoskeletal: Normal range of motion. He exhibits no edema or tenderness.   Lymphadenopathy:        Head (right side): No posterior auricular and no occipital adenopathy present.        Head (left side): No posterior auricular and no occipital adenopathy present.     He has no cervical adenopathy.        Right cervical: No superficial cervical, no deep cervical and no posterior cervical adenopathy present.       Left cervical: No superficial cervical, no deep cervical and no posterior cervical adenopathy present.     He has no axillary adenopathy.        Right: No supraclavicular adenopathy present.        Left: No supraclavicular adenopathy present.   Neurological: He is alert and oriented to person, place, and time. He has normal strength. No cranial nerve deficit. Coordination abnormal.   Skin: Skin is dry. No rash noted. He is not diaphoretic. No cyanosis or erythema. Nails show no clubbing.    Psychiatric: His behavior is normal. Judgment and thought content normal. His mood appears anxious. Cognition and memory are normal. He exhibits a depressed mood.   Vitals reviewed.          Assessment:      1. Unintentional weight loss    2. Gastrointestinal bleeding, lower           Plan:      review information with them at this point abnormalities noted iron deficiency.  Review of outside records demonstrates patient has had CT head no evidence of abnormality chest x-ray unremarkable.  Previous history of melanoma will proceed with CT chest abdomen pelvis for review also patient has elevated beta natural take factor 2D echo ordered to see whether not possible congestive heart failure contributing to cardiac actually a discussed implications will see back after above findings      Jett Elizalde Jr, MD FACP

## 2019-08-16 ENCOUNTER — TELEPHONE (OUTPATIENT)
Dept: HEMATOLOGY/ONCOLOGY | Facility: CLINIC | Age: 84
End: 2019-08-16

## 2019-08-16 NOTE — TELEPHONE ENCOUNTER
----- Message from Melanie Harris MA sent at 8/16/2019  2:21 PM CDT -----  Contact: mehrdad-daughter      ----- Message -----  From: Babs Jenkins  Sent: 8/16/2019   2:08 PM  To: [facility] Staff    Type:  Needs Medical Advice    Who Called: pt  Symptoms (please be specific): n/a   How long has patient had these symptoms: n/a  Pharmacy name and phone #: n/a  Would the patient rather a call back or a response via [facility]? Call back  Best Call Back Number: [phone]  Additional Information: requesting call back regarding questions about ct scan for pt.      Thanks,  Babs Jenkins

## 2019-08-19 ENCOUNTER — TELEPHONE (OUTPATIENT)
Dept: RADIOLOGY | Facility: HOSPITAL | Age: 84
End: 2019-08-19

## 2019-08-20 ENCOUNTER — HOSPITAL ENCOUNTER (OUTPATIENT)
Dept: RADIOLOGY | Facility: HOSPITAL | Age: 84
Discharge: HOME | End: 2019-08-20
Attending: INTERNAL MEDICINE
Payer: MEDICARE

## 2019-08-20 DIAGNOSIS — K92.2 GASTROINTESTINAL BLEEDING, LOWER: ICD-10-CM

## 2019-08-20 DIAGNOSIS — R63.4 UNINTENTIONAL WEIGHT LOSS: ICD-10-CM

## 2019-08-20 PROCEDURE — 25500020 PHARM REV CODE 255: Mod: HCNC | Performed by: INTERNAL MEDICINE

## 2019-08-20 PROCEDURE — 71260 CT CHEST ABDOMEN PELVIS WITH CONTRAST (XPD): ICD-10-PCS | Mod: 26,HCNC,, | Performed by: RADIOLOGY

## 2019-08-20 PROCEDURE — 71260 CT THORAX DX C+: CPT | Mod: 26,HCNC,, | Performed by: RADIOLOGY

## 2019-08-20 PROCEDURE — 74177 CT ABD & PELVIS W/CONTRAST: CPT | Mod: TC,HCNC

## 2019-08-20 PROCEDURE — 74177 CT CHEST ABDOMEN PELVIS WITH CONTRAST (XPD): ICD-10-PCS | Mod: 26,HCNC,, | Performed by: RADIOLOGY

## 2019-08-20 PROCEDURE — 74177 CT ABD & PELVIS W/CONTRAST: CPT | Mod: 26,HCNC,, | Performed by: RADIOLOGY

## 2019-08-20 RX ADMIN — IOHEXOL 75 ML: 350 INJECTION, SOLUTION INTRAVENOUS at 01:08

## 2019-08-20 RX ADMIN — IOHEXOL 30 ML: 350 INJECTION, SOLUTION INTRAVENOUS at 12:08

## 2019-08-21 ENCOUNTER — CLINICAL SUPPORT (OUTPATIENT)
Dept: CARDIOLOGY | Facility: CLINIC | Age: 84
End: 2019-08-21
Attending: INTERNAL MEDICINE
Payer: MEDICARE

## 2019-08-21 DIAGNOSIS — R63.4 UNINTENTIONAL WEIGHT LOSS: ICD-10-CM

## 2019-08-21 DIAGNOSIS — K92.2 GASTROINTESTINAL BLEEDING, LOWER: ICD-10-CM

## 2019-08-21 LAB
AORTIC VALVE REGURGITATION: NORMAL
ESTIMATED PA SYSTOLIC PRESSURE: 24.34
MITRAL VALVE REGURGITATION: NORMAL
RETIRED EF AND QEF - SEE NOTES: 55 (ref 55–65)
TRICUSPID VALVE REGURGITATION: NORMAL

## 2019-08-21 PROCEDURE — 93306 2D ECHO WITH COLOR FLOW DOPPLER: ICD-10-PCS | Mod: HCNC,S$GLB,, | Performed by: INTERNAL MEDICINE

## 2019-08-21 PROCEDURE — 93306 TTE W/DOPPLER COMPLETE: CPT | Mod: HCNC,S$GLB,, | Performed by: INTERNAL MEDICINE

## 2019-08-22 ENCOUNTER — OFFICE VISIT (OUTPATIENT)
Dept: HEMATOLOGY/ONCOLOGY | Facility: CLINIC | Age: 84
End: 2019-08-22
Payer: MEDICARE

## 2019-08-22 VITALS
BODY MASS INDEX: 17.07 KG/M2 | HEIGHT: 68 IN | WEIGHT: 112.63 LBS | RESPIRATION RATE: 18 BRPM | HEART RATE: 82 BPM | OXYGEN SATURATION: 80 % | TEMPERATURE: 97 F | DIASTOLIC BLOOD PRESSURE: 67 MMHG | SYSTOLIC BLOOD PRESSURE: 133 MMHG

## 2019-08-22 DIAGNOSIS — R63.4 UNINTENTIONAL WEIGHT LOSS: Primary | ICD-10-CM

## 2019-08-22 DIAGNOSIS — I77.1 TORTUOUS AORTA: ICD-10-CM

## 2019-08-22 DIAGNOSIS — R64 CACHEXIA: ICD-10-CM

## 2019-08-22 DIAGNOSIS — D50.0 IRON DEFICIENCY ANEMIA DUE TO CHRONIC BLOOD LOSS: ICD-10-CM

## 2019-08-22 PROCEDURE — 1101F PR PT FALLS ASSESS DOC 0-1 FALLS W/OUT INJ PAST YR: ICD-10-PCS | Mod: HCNC,CPTII,S$GLB, | Performed by: INTERNAL MEDICINE

## 2019-08-22 PROCEDURE — 99999 PR PBB SHADOW E&M-EST. PATIENT-LVL III: CPT | Mod: PBBFAC,HCNC,, | Performed by: INTERNAL MEDICINE

## 2019-08-22 PROCEDURE — 1101F PT FALLS ASSESS-DOCD LE1/YR: CPT | Mod: HCNC,CPTII,S$GLB, | Performed by: INTERNAL MEDICINE

## 2019-08-22 PROCEDURE — 99214 PR OFFICE/OUTPT VISIT, EST, LEVL IV, 30-39 MIN: ICD-10-PCS | Mod: HCNC,S$GLB,, | Performed by: INTERNAL MEDICINE

## 2019-08-22 PROCEDURE — 99214 OFFICE O/P EST MOD 30 MIN: CPT | Mod: HCNC,S$GLB,, | Performed by: INTERNAL MEDICINE

## 2019-08-22 PROCEDURE — 99999 PR PBB SHADOW E&M-EST. PATIENT-LVL III: ICD-10-PCS | Mod: PBBFAC,HCNC,, | Performed by: INTERNAL MEDICINE

## 2019-08-22 RX ORDER — MIRTAZAPINE 15 MG/1
15 TABLET, FILM COATED ORAL NIGHTLY
Qty: 30 TABLET | Refills: 2 | Status: SHIPPED | OUTPATIENT
Start: 2019-08-22 | End: 2020-08-21

## 2019-08-22 NOTE — PROGRESS NOTES
Subjective:       Patient ID: Wilder Alejandra is a 89 y.o. male.    Chief Complaint: Results (Unintentional weight loss)    HPI 89-year-old male returns unintentional weight loss for review of laboratory evaluation with son and daughter present    Past Medical History:   Diagnosis Date    Anxiety 10/25/2017    Anxiety 10/25/2017    Arthritis     Cataract     Coronary artery disease     Depression     Diabetes mellitus     Diverticulosis     Hyperlipidemia     Hypertension associated with diabetes 2016    Inguinal hernia, left     Liver cyst     Osteoporosis 10/18/2016    Renal cyst 2010    US retrop    Strabismus     Type 2 diabetes mellitus      Family History   Problem Relation Age of Onset    No Known Problems Mother     No Known Problems Father     Strabismus Neg Hx     Retinal detachment Neg Hx     Macular degeneration Neg Hx     Glaucoma Neg Hx     Blindness Neg Hx     Amblyopia Neg Hx     Cancer Neg Hx     Cataracts Neg Hx     Diabetes Neg Hx     Hypertension Neg Hx     Stroke Neg Hx     Thyroid disease Neg Hx      Social History     Socioeconomic History    Marital status:      Spouse name: Not on file    Number of children: Not on file    Years of education: Not on file    Highest education level: Not on file   Occupational History    Not on file   Social Needs    Financial resource strain: Not on file    Food insecurity:     Worry: Not on file     Inability: Not on file    Transportation needs:     Medical: Not on file     Non-medical: Not on file   Tobacco Use    Smoking status: Former Smoker     Packs/day: 1.00     Years: 15.00     Pack years: 15.00     Last attempt to quit: 1955     Years since quittin.6    Smokeless tobacco: Never Used   Substance and Sexual Activity    Alcohol use: Yes     Alcohol/week: 8.4 oz     Types: 7 Shots of liquor, 7 Glasses of wine per week    Drug use: No    Sexual activity: Never   Lifestyle    Physical  activity:     Days per week: Not on file     Minutes per session: Not on file    Stress: Not on file   Relationships    Social connections:     Talks on phone: Not on file     Gets together: Not on file     Attends Jain service: Not on file     Active member of club or organization: Not on file     Attends meetings of clubs or organizations: Not on file     Relationship status: Not on file   Other Topics Concern    Not on file   Social History Narrative    Not on file     Past Surgical History:   Procedure Laterality Date    CATARACT EXTRACTION Bilateral     heart stent surgery  1990s    YAG         Labs:  Lab Results   Component Value Date    WBC 9.07 08/06/2019    HGB 12.0 (L) 08/06/2019    HCT 38.1 (L) 08/06/2019    MCV 94 08/06/2019     08/06/2019     BMP  Lab Results   Component Value Date     08/06/2019    K 4.5 08/06/2019     08/06/2019    CO2 24 08/06/2019    BUN 20 08/06/2019    CREATININE 0.8 08/06/2019    CALCIUM 9.4 08/06/2019    ANIONGAP 13 08/06/2019    ESTGFRAFRICA >60 08/06/2019    EGFRNONAA >60 08/06/2019     Lab Results   Component Value Date    ALT 13 08/06/2019    AST 21 08/06/2019    ALKPHOS 77 08/06/2019    BILITOT 1.1 (H) 08/06/2019       Lab Results   Component Value Date    IRON 58 08/06/2019    TIBC 357 08/06/2019    FERRITIN 51 08/06/2019     Lab Results   Component Value Date    EDZLWKOH03 >2000 (H) 08/06/2019     Lab Results   Component Value Date    FOLATE 17.7 09/21/2009     Lab Results   Component Value Date    TSH 2.048 07/24/2019         Review of Systems   Constitutional: Positive for appetite change and unexpected weight change. Negative for activity change, chills, diaphoresis, fatigue and fever.   HENT: Negative for congestion, dental problem, drooling, ear discharge, ear pain, facial swelling, hearing loss, mouth sores, nosebleeds, postnasal drip, rhinorrhea, sinus pressure, sneezing, sore throat, tinnitus, trouble swallowing and voice change.     Eyes: Negative for photophobia, pain, discharge, redness, itching and visual disturbance.   Respiratory: Negative for apnea, cough, choking, chest tightness, shortness of breath, wheezing and stridor.    Cardiovascular: Negative for chest pain, palpitations and leg swelling.   Gastrointestinal: Negative for abdominal distention, abdominal pain, anal bleeding, blood in stool, constipation, diarrhea, nausea, rectal pain and vomiting.   Endocrine: Negative for cold intolerance, heat intolerance, polydipsia, polyphagia and polyuria.   Genitourinary: Negative for decreased urine volume, difficulty urinating, discharge, dysuria, enuresis, flank pain, frequency, genital sores, hematuria, penile pain, penile swelling, scrotal swelling, testicular pain and urgency.   Musculoskeletal: Negative for arthralgias, back pain, gait problem, joint swelling, myalgias, neck pain and neck stiffness.   Skin: Negative for color change, pallor, rash and wound.   Allergic/Immunologic: Negative for environmental allergies, food allergies and immunocompromised state.   Neurological: Negative for dizziness, tremors, seizures, syncope, facial asymmetry, speech difficulty, weakness, light-headedness, numbness and headaches.   Hematological: Negative for adenopathy. Does not bruise/bleed easily.   Psychiatric/Behavioral: Positive for dysphoric mood. Negative for agitation, behavioral problems, confusion, decreased concentration, hallucinations, self-injury, sleep disturbance and suicidal ideas. The patient is nervous/anxious. The patient is not hyperactive.        Objective:      Physical Exam   Constitutional: He is oriented to person, place, and time. He appears cachectic. He has a sickly appearance. He appears ill. He appears distressed.   HENT:   Head: Normocephalic.   Right Ear: External ear normal.   Left Ear: External ear normal.   Nose: Nose normal. Right sinus exhibits no maxillary sinus tenderness and no frontal sinus tenderness.  Left sinus exhibits no maxillary sinus tenderness and no frontal sinus tenderness.   Mouth/Throat: Oropharynx is clear and moist. No oropharyngeal exudate.   Eyes: Pupils are equal, round, and reactive to light. EOM and lids are normal. Right eye exhibits no discharge. Left eye exhibits no discharge. Right conjunctiva is not injected. Right conjunctiva has no hemorrhage. Left conjunctiva is not injected. Left conjunctiva has no hemorrhage. No scleral icterus. Right eye exhibits normal extraocular motion. Left eye exhibits normal extraocular motion.   Neck: Normal range of motion. Neck supple. No JVD present. No tracheal deviation present. No thyromegaly present.   Cardiovascular: Normal rate and regular rhythm.   Pulmonary/Chest: Effort normal. No stridor. No respiratory distress.   Abdominal: Soft. He exhibits no mass. There is no hepatosplenomegaly, splenomegaly or hepatomegaly. There is no tenderness.   Musculoskeletal: Normal range of motion. He exhibits no edema or tenderness.   Lymphadenopathy:        Head (right side): No posterior auricular and no occipital adenopathy present.        Head (left side): No posterior auricular and no occipital adenopathy present.     He has no cervical adenopathy.        Right cervical: No superficial cervical, no deep cervical and no posterior cervical adenopathy present.       Left cervical: No superficial cervical, no deep cervical and no posterior cervical adenopathy present.     He has no axillary adenopathy.        Right: No supraclavicular adenopathy present.        Left: No supraclavicular adenopathy present.   Neurological: He is alert and oriented to person, place, and time. He has normal strength. No cranial nerve deficit. Coordination normal.   Skin: Skin is dry. No rash noted. He is not diaphoretic. No cyanosis or erythema. Nails show no clubbing.   Psychiatric: His behavior is normal. Judgment and thought content normal. His mood appears anxious. Cognition and  memory are normal. He exhibits a depressed mood.   Vitals reviewed.          Assessment:      1. Unintentional weight loss    2. Tortuous aorta    3. Iron deficiency anemia due to chronic blood loss           Plan:     The patient has unintentional weight loss loss of appetite review of laboratory studies demonstrates CT chest abdomen pelvis no evidence of of malignancy.  Laboratory studies demonstrates iron deficiency discussed above EGD and colon declined will treat with iron rich foods discussed pros and cons of oral iron supplementation versus intravenous iron will try iron rich food return 3 months answered questions will try Remeron 15 mg to see whether not increase appetite stimulation as well as increased bili personally discussed implications strongly urged patient in front of children not to drive been weakened condition for increase risk of safety for both himself as well as published        Jett Elizalde Jr, MD FACP

## 2019-08-25 ENCOUNTER — HOSPITAL ENCOUNTER (EMERGENCY)
Facility: HOSPITAL | Age: 84
Discharge: HOME | End: 2019-08-25
Attending: FAMILY MEDICINE
Payer: MEDICARE

## 2019-08-25 VITALS
HEIGHT: 68 IN | SYSTOLIC BLOOD PRESSURE: 128 MMHG | RESPIRATION RATE: 16 BRPM | OXYGEN SATURATION: 98 % | BODY MASS INDEX: 16.87 KG/M2 | HEART RATE: 78 BPM | TEMPERATURE: 98 F | DIASTOLIC BLOOD PRESSURE: 62 MMHG | WEIGHT: 111.31 LBS

## 2019-08-25 DIAGNOSIS — S01.112A LACERATION OF LEFT EYEBROW, INITIAL ENCOUNTER: ICD-10-CM
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
## 2019-08-25 DIAGNOSIS — S00.33XA CONTUSION OF NOSE, INITIAL ENCOUNTER: ICD-10-CM

## 2019-08-25 DIAGNOSIS — S05.12XA CONTUSION OF LEFT EYE, INITIAL ENCOUNTER: ICD-10-CM

## 2019-08-25 DIAGNOSIS — S01.21XA LACERATION OF NOSE, INITIAL ENCOUNTER: ICD-10-CM

## 2019-08-25 DIAGNOSIS — K04.7 DENTAL ABSCESS: ICD-10-CM

## 2019-08-25 PROCEDURE — 25000003 PHARM REV CODE 250: Mod: HCNC | Performed by: PHYSICIAN ASSISTANT

## 2019-08-25 PROCEDURE — 63600175 PHARM REV CODE 636 W HCPCS: Mod: HCNC | Performed by: PHYSICIAN ASSISTANT

## 2019-08-25 PROCEDURE — 12013 RPR F/E/E/N/L/M 2.6-5.0 CM: CPT | Mod: HCNC

## 2019-08-25 PROCEDURE — 90471 IMMUNIZATION ADMIN: CPT | Mod: HCNC | Performed by: PHYSICIAN ASSISTANT

## 2019-08-25 PROCEDURE — 99284 EMERGENCY DEPT VISIT MOD MDM: CPT | Mod: 25,HCNC

## 2019-08-25 PROCEDURE — 90715 TDAP VACCINE 7 YRS/> IM: CPT | Mod: HCNC | Performed by: PHYSICIAN ASSISTANT

## 2019-08-25 RX ORDER — LIDOCAINE HYDROCHLORIDE 10 MG/ML
10 INJECTION, SOLUTION EPIDURAL; INFILTRATION; INTRACAUDAL; PERINEURAL
Status: COMPLETED | OUTPATIENT
Start: 2019-08-25 | End: 2019-08-25

## 2019-08-25 RX ORDER — AMOXICILLIN AND CLAVULANATE POTASSIUM 875; 125 MG/1; MG/1
1 TABLET, FILM COATED ORAL 2 TIMES DAILY
Qty: 14 TABLET | Refills: 0 | Status: SHIPPED | OUTPATIENT
Start: 2019-08-25 | End: 2019-09-03

## 2019-08-25 RX ADMIN — LIDOCAINE HYDROCHLORIDE 100 MG: 10 INJECTION, SOLUTION EPIDURAL; INFILTRATION; INTRACAUDAL; PERINEURAL at 09:08

## 2019-08-25 RX ADMIN — CLOSTRIDIUM TETANI TOXOID ANTIGEN (FORMALDEHYDE INACTIVATED), CORYNEBACTERIUM DIPHTHERIAE TOXOID ANTIGEN (FORMALDEHYDE INACTIVATED), BORDETELLA PERTUSSIS TOXOID ANTIGEN (GLUTARALDEHYDE INACTIVATED), BORDETELLA PERTUSSIS FILAMENTOUS HEMAGGLUTININ ANTIGEN (FORMALDEHYDE INACTIVATED), BORDETELLA PERTUSSIS PERTACTIN ANTIGEN, AND BORDETELLA PERTUSSIS FIMBRIAE 2/3 ANTIGEN 0.5 ML: 5; 2; 2.5; 5; 3; 5 INJECTION, SUSPENSION INTRAMUSCULAR at 08:08

## 2019-08-26 NOTE — ED PROVIDER NOTES
SCRIBE #1 NOTE: I, Randi Valdes, am scribing for, and in the presence of, Emerald Carmen PA-C. I have scribed the entire note.      History      Chief Complaint   Patient presents with    Fall     states slipped on mud and hit face on concrete. Denies LOC. Laceration to left eyebrow with abrasion and bruise to left cheek. Abrasion to left knee. Ambulatory at present       Review of patient's allergies indicates:  No Known Allergies     HPI   HPI    8/25/2019, 7:52 PM   History obtained from the patient      History of Present Illness: Wilder Alejandra is a 89 y.o. male patient who presents to the Emergency Department for evaluation of a fall, onset just PTA. Pt reports he tripped and fell on concrete. Symptoms are constant and moderate in severity. No mitigating or exacerbating factors reported. Associated sxs include L knee abrasion, L eyebrow laceration, and abrasions of each side of nose from eye glasses.  Patient denies any fever, chills, LOC, vision changes, weakness, numbness, SOB, chest pain, dizziness and all other sxs at this time. No prior Tx reported. No further complaints or concerns at this time.         Arrival mode: Personal vehicle       PCP: Reynaldo Sigala MD       Past Medical History:  Past Medical History:   Diagnosis Date    Anxiety 10/25/2017    Anxiety 10/25/2017    Arthritis     Cataract     Coronary artery disease     Depression     Diabetes mellitus     Diverticulosis     Hyperlipidemia     Hypertension associated with diabetes 6/8/2016    Inguinal hernia, left     Liver cyst     Osteoporosis 10/18/2016    Renal cyst 06/01/2010    US retrop    Strabismus     Type 2 diabetes mellitus        Past Surgical History:  Past Surgical History:   Procedure Laterality Date    CATARACT EXTRACTION Bilateral     heart stent surgery  1990s    YAG           Family History:  Family History   Problem Relation Age of Onset    No Known Problems Mother     No Known Problems Father      Strabismus Neg Hx     Retinal detachment Neg Hx     Macular degeneration Neg Hx     Glaucoma Neg Hx     Blindness Neg Hx     Amblyopia Neg Hx     Cancer Neg Hx     Cataracts Neg Hx     Diabetes Neg Hx     Hypertension Neg Hx     Stroke Neg Hx     Thyroid disease Neg Hx        Social History:  Social History     Tobacco Use    Smoking status: Former Smoker     Packs/day: 1.00     Years: 15.00     Pack years: 15.00     Last attempt to quit:      Years since quittin.6    Smokeless tobacco: Never Used   Substance and Sexual Activity    Alcohol use: Yes     Alcohol/week: 8.4 oz     Types: 7 Shots of liquor, 7 Glasses of wine per week    Drug use: No    Sexual activity: Never       ROS   Review of Systems   Constitutional: Negative for chills and fever.   HENT: Negative for sore throat.    Respiratory: Negative for shortness of breath.    Cardiovascular: Negative for chest pain.   Gastrointestinal: Negative for nausea.   Genitourinary: Negative for dysuria.   Musculoskeletal: Negative for back pain.   Skin: Positive for wound (L eyebrow laceration, L cheek bruise and abrasion, L knee abrasion ). Negative for rash.   Neurological: Negative for syncope, weakness and numbness.   Hematological: Does not bruise/bleed easily.   All other systems reviewed and are negative.      Physical Exam      Initial Vitals [19]   BP Pulse Resp Temp SpO2   134/60 82 20 97.6 °F (36.4 °C) 97 %      MAP       --          Physical Exam  Nursing Notes and Vital Signs Reviewed.  Constitutional: Patient is in mild distress. Well-developed and well-nourished.  Head: Atraumatic. Normocephalic.  Eyes: PERRL. EOM intact. Conjunctivae are not pale. No scleral icterus.  Bruising of upper and lower eyelid of left eye.    ENT: Mucous membranes are moist. Oropharynx is clear and symmetric.  Bruising of nose.  Neck: Supple. Full ROM. No lymphadenopathy.  Cardiovascular: Regular rate. Regular rhythm. No murmurs, rubs,  or gallops. Distal pulses are 2+ and symmetric.  Pulmonary/Chest: No respiratory distress. Clear to auscultation bilaterally. No wheezing or rales.  Abdominal: Soft and non-distended.  There is no tenderness.  No rebound, guarding, or rigidity. Good bowel sounds.  Musculoskeletal: Moves all extremities. No obvious deformities. No edema. No calf tenderness.  Skin: Warm and dry. Abrasion of the L knee. Less than 1 cm laceration to the L eyebrow.  Lacerations of each side of nose (left 1.5 cm, right 1 cm).  All lacerations have edges well approximated.   Neurological:  Alert, awake, and appropriate.  Normal speech.  No acute focal neurological deficits are appreciated.  Psychiatric: Normal affect. Good eye contact. Appropriate in content.    ED Course    Lac Repair  Date/Time: 2019 9:57 PM  Performed by: Emerald Carmen PA-C  Authorized by: Emerald Carmen PA-C   Consent Done: Yes  Consent: Verbal consent obtained.  Risks and benefits: risks, benefits and alternatives were discussed  Consent given by: patient  Patient understanding: patient states understanding of the procedure being performed  Patient consent: the patient's understanding of the procedure matches consent given  Patient identity confirmed: , name and verbally with patient  Body area: head/neck  Location details: left eyebrow  Laceration length: 0.5 cm  Foreign bodies: no foreign bodies  Irrigation solution: saline  Irrigation method: syringe  Skin closure: glue  Patient tolerance: Patient tolerated the procedure well with no immediate complications  Comments: No lidocaine used.  Patient requested skin glue rather than sutures.     Lac Repair  Date/Time: 2019 9:59 PM  Performed by: Emerald Carmen PA-C  Authorized by: Emerald Carmen PA-C   Consent Done: Yes  Consent: Verbal consent obtained.  Risks and benefits: risks, benefits and alternatives were discussed  Consent given by: patient  Patient understanding: patient states  "understanding of the procedure being performed  Patient consent: the patient's understanding of the procedure matches consent given  Patient identity confirmed: , name and verbally with patient  Body area: head/neck (L nostril)  Location details: nose  Laceration length: 1.5 cm  Foreign bodies: no foreign bodies  Irrigation solution: saline  Irrigation method: syringe  Skin closure: glue  Patient tolerance: Patient tolerated the procedure well with no immediate complications    Lac Repair  Date/Time: 2019 9:59 PM  Performed by: Emerald Carmen PA-C  Authorized by: Emerald Carmen PA-C   Consent Done: Yes  Consent: Verbal consent obtained.  Risks and benefits: risks, benefits and alternatives were discussed  Consent given by: patient  Patient understanding: patient states understanding of the procedure being performed  Patient consent: the patient's understanding of the procedure matches consent given  Patient identity confirmed: , name and verbally with patient  Body area: head/neck (R nostril)  Location details: nose  Laceration length: 1 cm  Foreign bodies: no foreign bodies  Preparation: Patient was prepped and draped in the usual sterile fashion.  Irrigation solution: saline  Irrigation method: syringe  Amount of cleaning: standard  Skin closure: glue  Patient tolerance: Patient tolerated the procedure well with no immediate complications        ED Vital Signs:  Vitals:    19 1925   BP: 134/60   Pulse: 82   Resp: 20   Temp: 97.6 °F (36.4 °C)   TempSrc: Oral   SpO2: 97%   Weight: 50.5 kg (111 lb 5.3 oz)   Height: 5' 7.5" (1.715 m)       Abnormal Lab Results:  Labs Reviewed - No data to display     All Lab Results:  None    Imaging Results:  Imaging Results          CT Maxillofacial Without Contrast (Final result)  Result time 19 20:47:19    Final result by Saul Reid MD (19 20:47:19)                 Impression:      1.  Mild left frontal supraorbital scalp contusion with mild " swelling.    2.  Negative for fracture.    3.  Mild mucosal thickening right maxillary sinus floor.  Bilateral maxillary molar tooth periapical lucencies suggestive of periapical abscesses.    All CT scans at this facility are performed  using dose modulation techniques as appropriate to performed exam including the following:  automated exposure control; adjustment of mA and/or kV according to the patients size (this includes techniques or standardized protocols for targeted exams where dose is matched to indication/reason for exam: i.e. extremities or head);  iterative reconstruction technique.      Electronically signed by: Saul Reid MD  Date:    08/25/2019  Time:    20:47             Narrative:    EXAMINATION:  CT MAXILLOFACIAL WITHOUT CONTRAST    CLINICAL HISTORY:  Maxface trauma blunt;    TECHNIQUE:  CT maxillofacial performed with 0.25 mm axial imaging.  Bone and soft tissue algorithms.  Coronal and sagittal reformations.    COMPARISON:  None    FINDINGS:  Mild left frontal supraorbital scalp contusion with mild swelling.  The globes are intact.  There is no postseptal intraorbital stranding.  Skull base is intact.  There is no orbital or facial bone fracture.  The paranasal sinuses and mastoids are well aerated other than minor mucosal thickening right maxillary sinus, 4 mm.  There is no additional soft tissue swelling.  Periodontal disease with periapical lucencies a bilateral molar maxillary teeth suggestive of small periapical abscesses.                               CT Head Without Contrast (Final result)  Result time 08/25/19 20:42:45    Final result by Saul Reid MD (08/25/19 20:42:45)                 Impression:      There is no CT evidence of acute intracranial posttraumatic injury.  No bleed.  Mild left frontal supraorbital scalp contusion.  Age-appropriate advanced cerebral atrophy.    All CT scans at this facility are performed  using dose modulation techniques as appropriate to performed  exam including the following:  automated exposure control; adjustment of mA and/or kV according to the patients size (this includes techniques or standardized protocols for targeted exams where dose is matched to indication/reason for exam: i.e. extremities or head);  iterative reconstruction technique.      Electronically signed by: Saul Reid MD  Date:    08/25/2019  Time:    20:42             Narrative:    EXAMINATION:  CT HEAD WITHOUT CONTRAST    CLINICAL HISTORY:  Head trauma, minor, GCS>=13, NOC/NEXUS/CCR positive, first study;Head trauma, headache;Maxface trauma blunt;    TECHNIQUE:  Routine noncontrast head CT.    COMPARISON:  None    FINDINGS:  There is no acute intracranial hemorrhage or abnormal extra-axial fluid collection.  There is age-appropriate advanced cerebral atrophy with ventricular-sulcal congruence.  There is no abnormal increased or decreased density within the brain parenchyma.  Gray-white differentiation preserved.  No intracranial mass or mass effect.  Mild left frontal supraorbital scalp swelling characteristic of a minor contusion.  The calvarium is intact.  Visualized paranasal sinuses and mastoids are well aerated.                                        The Emergency Provider reviewed the vital signs and test results, which are outlined above.    ED Discussion     10:01 PM: Reassessed pt at this time.  Discussed with pt all pertinent ED information and results. Discussed pt dx and plan of tx. Gave pt all f/u and return to the ED instructions. All questions and concerns were addressed at this time. Pt expresses understanding of information and instructions, and is comfortable with plan to discharge. Pt is stable for discharge.    I discussed wound care precautions with patient and/or family/caretaker; specifically that all wounds have risk of infection despite efforts to cleanse and debride the wound; and there is a risk of an occult foreign body (and thus increased risk of infection)  despite a negative examination.  I discussed with patient need to return for any signs of infection, specifically redness, increased pain, fever, drainage of pus, or any concern, immediately.      ED Medication(s):  Medications   Tdap vaccine injection 0.5 mL (0.5 mLs Intramuscular Given 8/25/19 2012)   lidocaine (PF) 10 mg/ml (1%) injection 100 mg (100 mg Infiltration Given 8/25/19 2116)       Follow-up Information     Reynaldo Sigala MD. Schedule an appointment as soon as possible for a visit in 3 days.    Specialty:  Internal Medicine  Contact information:  [address]  [place] [id number] [phone]                   New Prescriptions    AMOXICILLIN-CLAVULANATE 875-125MG (AUGMENTIN) 875-125 MG PER TABLET    Take 1 tablet by mouth 2 (two) times daily.       Medical Decision Making    Medical Decision Making:   Clinical Tests:   Radiological Study: Ordered and Reviewed           Scribe Attestation:   Scribe #1: I performed the above scribed service and the documentation accurately describes the services I performed. I attest to the accuracy of the note.    Attending:   Physician Attestation Statement for Scribe #1: I,Emerald Carmen PA-C , personally performed the services described in this documentation, as scribed by Randi Valdes, in my presence, and it is both accurate and complete.          Clinical Impression       ICD-10-CM ICD-9-CM   1. Fall, initial encounter W19.XXXA E888.9   2. Contusion of nose, initial encounter S00.33XA 920   3. Laceration of nose, initial encounter S01.21XA 873.20   4. Laceration of left eyebrow, initial encounter S01.112A 873.42   5. Contusion of left eye, initial encounter S05.12XA 921.9   6. Dental abscess K04.7 522.5       Disposition:   Disposition: Discharged  Condition: Stable         Emerald Carmen PA-C  08/25/19 [id number]

## 2019-09-03 ENCOUNTER — OFFICE VISIT (OUTPATIENT)
Dept: INTERNAL MEDICINE | Facility: CLINIC | Age: 84
End: 2019-09-03
Payer: MEDICARE

## 2019-09-03 VITALS
DIASTOLIC BLOOD PRESSURE: 58 MMHG | TEMPERATURE: 99 F | WEIGHT: 110 LBS | SYSTOLIC BLOOD PRESSURE: 106 MMHG | BODY MASS INDEX: 16.67 KG/M2 | HEIGHT: 68 IN

## 2019-09-03 DIAGNOSIS — R19.7 DIARRHEA, UNSPECIFIED TYPE: ICD-10-CM

## 2019-09-03 DIAGNOSIS — R05.9 COUGH: ICD-10-CM

## 2019-09-03 DIAGNOSIS — Z09 HOSPITAL DISCHARGE FOLLOW-UP: Primary | ICD-10-CM

## 2019-09-03 DIAGNOSIS — R68.2 DRY MOUTH: ICD-10-CM

## 2019-09-03 PROCEDURE — 99999 PR PBB SHADOW E&M-EST. PATIENT-LVL III: ICD-10-PCS | Mod: PBBFAC,HCNC,, | Performed by: NURSE PRACTITIONER

## 2019-09-03 PROCEDURE — 99213 OFFICE O/P EST LOW 20 MIN: CPT | Mod: HCNC,S$GLB,, | Performed by: NURSE PRACTITIONER

## 2019-09-03 PROCEDURE — 1101F PT FALLS ASSESS-DOCD LE1/YR: CPT | Mod: HCNC,CPTII,S$GLB, | Performed by: NURSE PRACTITIONER

## 2019-09-03 PROCEDURE — 99999 PR PBB SHADOW E&M-EST. PATIENT-LVL III: CPT | Mod: PBBFAC,HCNC,, | Performed by: NURSE PRACTITIONER

## 2019-09-03 PROCEDURE — 1101F PR PT FALLS ASSESS DOC 0-1 FALLS W/OUT INJ PAST YR: ICD-10-PCS | Mod: HCNC,CPTII,S$GLB, | Performed by: NURSE PRACTITIONER

## 2019-09-03 PROCEDURE — 99213 PR OFFICE/OUTPT VISIT, EST, LEVL III, 20-29 MIN: ICD-10-PCS | Mod: HCNC,S$GLB,, | Performed by: NURSE PRACTITIONER

## 2019-09-03 NOTE — PROGRESS NOTES
Subjective:       Patient ID: Wilder Alejandra is a 89 y.o. male.    Chief Complaint: Follow-up (ER follow up)    Patient presents for ER follow up.  Fell on last Sunday and went to ER. Has history of frequent falls.  No significant abnormalities.  Lacerations repaired.  Daughters presents today.  Patient currently living in an independent living facility.   Has been having some diarrhea.   Started on Augmentin to treat sinus abscess.  Patient not eating or drinking much. Was started on Remeron to help.      Review of Systems   Constitutional: Positive for appetite change, fatigue and unexpected weight change. Negative for chills.   Respiratory: Positive for cough. Negative for shortness of breath.    Cardiovascular: Negative for chest pain.   Gastrointestinal: Positive for diarrhea.   Musculoskeletal: Positive for gait problem. Negative for arthralgias.   Skin: Positive for pallor.   Psychiatric/Behavioral: Negative for agitation and confusion.       Objective:      Physical Exam   Constitutional: He appears well-developed.   HENT:   Head: Normocephalic and atraumatic.   Right Ear: Decreased hearing is noted.   Left Ear: Decreased hearing is noted.   Neck: Normal range of motion.   Cardiovascular: Normal rate and regular rhythm.   Pulmonary/Chest: Effort normal and breath sounds normal.   Neurological: He is alert.   Skin: Skin is warm. There is cyanosis.   Psychiatric: He has a normal mood and affect. His behavior is normal.       Assessment:       1. Hospital discharge follow-up    2. Diarrhea, unspecified type    3. Dry mouth    4. Cough        Plan:         Hospital discharge follow-up    Diarrhea, unspecified type  Comments:  Stop Augmentin.  Encourage hydration.      Dry mouth    Cough  -     X-Ray Chest PA And Lateral; Future; Expected date: 09/03/2019        Hold Augmentin for now.  Oral rehydration.  Discussed the importance.  CXR-today.  Schedule f/u in one week.  Will reach out to family on Friday for  an update on diarrhea.

## 2019-09-04 ENCOUNTER — TELEPHONE (OUTPATIENT)
Dept: INTERNAL MEDICINE | Facility: CLINIC | Age: 84
End: 2019-09-04

## 2019-09-09 ENCOUNTER — HOSPITAL ENCOUNTER (OUTPATIENT)
Dept: RADIOLOGY | Facility: HOSPITAL | Age: 84
Discharge: HOME | End: 2019-09-09
Attending: NURSE PRACTITIONER
Payer: MEDICARE

## 2019-09-09 ENCOUNTER — OFFICE VISIT (OUTPATIENT)
Dept: INTERNAL MEDICINE | Facility: CLINIC | Age: 84
End: 2019-09-09
Payer: MEDICARE

## 2019-09-09 VITALS
WEIGHT: 110.44 LBS | TEMPERATURE: 97 F | DIASTOLIC BLOOD PRESSURE: 70 MMHG | OXYGEN SATURATION: 96 % | BODY MASS INDEX: 17.04 KG/M2 | HEART RATE: 65 BPM | SYSTOLIC BLOOD PRESSURE: 104 MMHG

## 2019-09-09 DIAGNOSIS — R63.4 WEIGHT LOSS: Primary | ICD-10-CM

## 2019-09-09 DIAGNOSIS — E11.9 DIABETES MELLITUS TYPE 2 WITHOUT RETINOPATHY: ICD-10-CM

## 2019-09-09 DIAGNOSIS — R05.9 COUGH: ICD-10-CM

## 2019-09-09 DIAGNOSIS — I25.10 CORONARY ARTERY DISEASE INVOLVING NATIVE CORONARY ARTERY OF NATIVE HEART WITHOUT ANGINA PECTORIS: ICD-10-CM

## 2019-09-09 DIAGNOSIS — E11.59 HYPERTENSION ASSOCIATED WITH DIABETES: Chronic | ICD-10-CM

## 2019-09-09 DIAGNOSIS — I15.2 HYPERTENSION ASSOCIATED WITH DIABETES: Chronic | ICD-10-CM

## 2019-09-09 DIAGNOSIS — M81.0 OSTEOPOROSIS, UNSPECIFIED OSTEOPOROSIS TYPE, UNSPECIFIED PATHOLOGICAL FRACTURE PRESENCE: ICD-10-CM

## 2019-09-09 PROCEDURE — 71046 X-RAY EXAM CHEST 2 VIEWS: CPT | Mod: TC,HCNC

## 2019-09-09 PROCEDURE — 99999 PR PBB SHADOW E&M-EST. PATIENT-LVL III: CPT | Mod: PBBFAC,HCNC,, | Performed by: INTERNAL MEDICINE

## 2019-09-09 PROCEDURE — 99999 PR PBB SHADOW E&M-EST. PATIENT-LVL III: ICD-10-PCS | Mod: PBBFAC,HCNC,, | Performed by: INTERNAL MEDICINE

## 2019-09-09 PROCEDURE — 71046 X-RAY EXAM CHEST 2 VIEWS: CPT | Mod: 26,HCNC,, | Performed by: RADIOLOGY

## 2019-09-09 PROCEDURE — 99214 PR OFFICE/OUTPT VISIT, EST, LEVL IV, 30-39 MIN: ICD-10-PCS | Mod: HCNC,S$GLB,, | Performed by: INTERNAL MEDICINE

## 2019-09-09 PROCEDURE — 99214 OFFICE O/P EST MOD 30 MIN: CPT | Mod: HCNC,S$GLB,, | Performed by: INTERNAL MEDICINE

## 2019-09-09 PROCEDURE — 71046 XR CHEST PA AND LATERAL: ICD-10-PCS | Mod: 26,HCNC,, | Performed by: RADIOLOGY

## 2019-09-09 PROCEDURE — 1101F PT FALLS ASSESS-DOCD LE1/YR: CPT | Mod: HCNC,CPTII,S$GLB, | Performed by: INTERNAL MEDICINE

## 2019-09-09 PROCEDURE — 1101F PR PT FALLS ASSESS DOC 0-1 FALLS W/OUT INJ PAST YR: ICD-10-PCS | Mod: HCNC,CPTII,S$GLB, | Performed by: INTERNAL MEDICINE

## 2019-09-09 NOTE — PATIENT INSTRUCTIONS
Try 2 meal supplements daily plus dinner    Decrease nadolol to 20mg daily if ok with cardiology. Blood pressure September 106/58 and 104/70

## 2019-09-09 NOTE — PROGRESS NOTES
Subjective:      Patient ID: Wilder Alejandra is a 89 y.o. male.    Chief Complaint: Follow-up    HPI   89 yo with   Patient Active Problem List   Diagnosis    Refractive error    Macular degeneration, age related, nonexudative - Both Eyes    Lens replaced by other means - Both Eyes    CAD (coronary artery disease)    Ectropion of left eye    Pseudophakia    Hyperlipidemia associated with type 2 diabetes mellitus    Hypertension associated with diabetes    Osteoporosis    Tortuous aorta    Diabetes mellitus type 2 without retinopathy    Pseudophakia, both eyes    Bilateral hearing loss    Compulsive behavior    Unintentional weight loss    Cachexia     Past Medical History:   Diagnosis Date    Anemia     Anxiety 10/25/2017    Anxiety 10/25/2017    Arthritis     Cataract     Coronary artery disease     Depression     Diabetes mellitus     Diverticulosis     Hyperlipidemia     Hypertension associated with diabetes 6/8/2016    Inguinal hernia, left     Liver cyst     Osteoporosis 10/18/2016    Renal cyst 06/01/2010    US retrop    Strabismus     Type 2 diabetes mellitus      Here today with his daughters  Pt fell twice since last visit.   Has had 2 ER visits for falls.  Mid august Er at [facility]. Sounded confused and difficulty speaking.   After fall pt was confused and on ground. Not passed out. Did not remember fall.   8/25 [facility]  Pt fell at [place]. Hit his face. Bleeding brow.   8/25 slipped on mud on street.     Review of Systems   Constitutional: Negative for chills and fever.   HENT: Negative for ear pain and sore throat.    Respiratory: Negative for cough, shortness of breath and wheezing.    Cardiovascular: Negative for chest pain, palpitations and leg swelling.   Gastrointestinal: Negative for abdominal pain and blood in stool.   Genitourinary: Negative for dysuria and hematuria.   Neurological: Negative for seizures.   Psychiatric/Behavioral: Negative for agitation,  confusion and dysphoric mood. The patient is not nervous/anxious.      Objective:   /70 (BP Location: Right arm, Patient Position: Sitting, BP Method: Medium (Manual))   Pulse 65   Temp 97.4 °F (36.3 °C) (Tympanic)   Wt 50.1 kg (110 lb 7.2 oz)   SpO2 96%   BMI 17.04 kg/m²     Physical Exam   Constitutional: He is oriented to person, place, and time. No distress.   thin   HENT:   Head: Normocephalic and atraumatic.   Mouth/Throat: Oropharynx is clear and moist.   Eyes: Pupils are equal, round, and reactive to light. EOM are normal.   Neck: Neck supple. No thyromegaly present.   Cardiovascular: Normal rate and regular rhythm.   Pulmonary/Chest: Breath sounds normal. He has no wheezes. He has no rales.   Abdominal: Soft. Bowel sounds are normal. There is no tenderness.   Musculoskeletal: He exhibits no edema.   Lymphadenopathy:     He has no cervical adenopathy.   Neurological: He is alert and oriented to person, place, and time.   Skin: Skin is warm and dry.   Psychiatric: He has a normal mood and affect. His behavior is normal.       Assessment:     1. Weight loss    2. Hypertension associated with diabetes    3. Coronary artery disease involving native coronary artery of native heart without angina pectoris    4. Osteoporosis, unspecified osteoporosis type, unspecified pathological fracture presence    5. Diabetes mellitus type 2 without retinopathy      Plan:   Weight loss  Pt eating essentially one meal daily. Advise increase meal frequency and add meal supplement at least twice daily.   -     Fecal Immunochemical Test (iFOBT); Future; Expected date: 09/09/2019    Hypertension associated with diabetes  Stable    Coronary artery disease involving native coronary artery of native heart without angina pectoris  Cont recs and f/u as per cards    Osteoporosis, unspecified osteoporosis type, unspecified pathological fracture presence  Complete dxa    Diabetes mellitus type 2 without  retinopathy  stable      Lab Frequency Next Occurrence   Hemoglobin A1c Once 01/13/2020   DXA Bone Density Spine And Hip Once 07/17/2019   Fecal Immunochemical Test (iFOBT) Once 07/17/2019   CT Chest With Contrast Once 08/06/2019   CT Abdomen Pelvis With Contrast Once 08/06/2019   Ferritin Once 08/22/2019   CBC auto differential Once 08/22/2019   Iron and TIBC Once 08/22/2019       Problem List Items Addressed This Visit        Cardiac/Vascular    CAD (coronary artery disease)    Hypertension associated with diabetes (Chronic)       Endocrine    Diabetes mellitus type 2 without retinopathy       Orthopedic    Osteoporosis      Other Visit Diagnoses     Weight loss    -  Primary    Relevant Orders    Fecal Immunochemical Test (iFOBT)          Follow up in about 3 months (around 12/9/2019), or if symptoms worsen or fail to improve.

## 2019-09-12 ENCOUNTER — PATIENT OUTREACH (OUTPATIENT)
Dept: ADMINISTRATIVE | Facility: HOSPITAL | Age: 84
End: 2019-09-12

## 2019-09-16 ENCOUNTER — PATIENT MESSAGE (OUTPATIENT)
Dept: INTERNAL MEDICINE | Facility: CLINIC | Age: 84
End: 2019-09-16

## 2019-09-16 NOTE — TELEPHONE ENCOUNTER
Sorry for the misunderstanding but please ask patient family to notify cardiologist office  that I have recommended to decrease nadolol to 20mg daily.  Cards office will need to let pt know if there is reason this should not be done.

## 2019-09-18 ENCOUNTER — TELEPHONE (OUTPATIENT)
Dept: INTERNAL MEDICINE | Facility: CLINIC | Age: 84
End: 2019-09-18

## 2019-09-18 NOTE — TELEPHONE ENCOUNTER
Cardiology nurse calling about nadolol. I asked her if it would be ok to decrease nadolol to 20mg, she stated she will ask the doctor and let us know.

## 2019-09-18 NOTE — TELEPHONE ENCOUNTER
----- Message from Belkys Rojas sent at 9/18/2019 12:30 PM CDT -----  Contact: [facility](Melissa)-[phone]  Would like to consult with nurse regarding medication, it's ok for the patient to decrease his Nadolol, please call back at [phone]. Thanks/ar

## 2019-09-18 NOTE — TELEPHONE ENCOUNTER
----- Message from Amber Miguel sent at 9/18/2019  9:20 AM CDT -----  Contact: Ms To of Dr Montague's office   She will like a call back about a change in pt medication, she can be reached at [phone] Thanks

## 2019-09-18 NOTE — TELEPHONE ENCOUNTER
Okay.  Can cut the 40 mg tablets in half.  Let me know if patient/family would like me to send in 20 mg tablets.

## 2019-09-20 ENCOUNTER — TELEPHONE (OUTPATIENT)
Dept: INTERNAL MEDICINE | Facility: CLINIC | Age: 84
End: 2019-09-20

## 2019-09-20 DIAGNOSIS — R62.7 FAILURE TO THRIVE IN ADULT: Primary | ICD-10-CM

## 2019-09-20 DIAGNOSIS — R11.2 INTRACTABLE VOMITING WITH NAUSEA, UNSPECIFIED VOMITING TYPE: ICD-10-CM

## 2019-09-20 NOTE — TELEPHONE ENCOUNTER
----- Message from Colleen Berry sent at 9/20/2019 10:44 AM CDT -----  Contact: Lyudmila with [facility]  Caller would like nurse to return her call regarding pt, caller states pt daughter request orders for Hospice service for pt. Please contact Lyudmila at ([phone])      [facility]  Fax: [phone]

## 2019-09-20 NOTE — TELEPHONE ENCOUNTER
"Pt wont take meds, isnt eating, throwing up. Wont get out of bed. Pt daughter states hes "almost dead". And is requesting orders for hospice  "

## 2019-10-01 ENCOUNTER — TELEPHONE (OUTPATIENT)
Dept: INTERNAL MEDICINE | Facility: CLINIC | Age: 84
End: 2019-10-01

## 2019-10-01 NOTE — TELEPHONE ENCOUNTER
----- Message from Danika Jacobs sent at 10/1/2019 12:23 PM CDT -----  Contact: Ana Shane- [phone]  .Type:  Patient Returning Call    Who Called:Ana Rebollar  Who Left Message for Patient:Melanie  Does the patient know what this is regarding?:no  Would the patient rather a call back or a response via [facility]? Call back  Best Call Back Number:[phone]  Additional Information:

## 2019-10-01 NOTE — TELEPHONE ENCOUNTER
Attempted to contact pt daughter to inform her that the pt will need an appt to fill out paperwork.

## 2019-10-01 NOTE — TELEPHONE ENCOUNTER
----- Message from Elisha Rico sent at 10/1/2019 10:14 AM CDT -----  Contact: [facility]- Ana Rebollar  Calling for the paperwork sent over for the patient, patient is being admitted into assisted living tomorrow and they haven't received his paperwork from Dr. Sigala. Patient cannot be admitted with out the paperwork . Ph [phone].

## 2019-10-02 ENCOUNTER — TELEPHONE (OUTPATIENT)
Dept: INTERNAL MEDICINE | Facility: CLINIC | Age: 84
End: 2019-10-02

## 2019-10-02 NOTE — TELEPHONE ENCOUNTER
----- Message from Stephany Lozano sent at 10/2/2019 10:22 AM CDT -----  Contact: DAUGHTER-LARISSA  Type:  Patient Returning Call    Who Called:DAUGHTER  Who Left Message for Patient:CAROL  Does the patient know what this is regarding?:YES  Would the patient rather a call back or a response via [facility]? CALL BACK  Best Call Back Number:[phone]  Additional Information:

## 2019-10-02 NOTE — TELEPHONE ENCOUNTER
Informed pt daughter that the pt needs an appt in order to complete paperwork. She verbalized understanding.

## 2019-10-08 ENCOUNTER — TELEPHONE (OUTPATIENT)
Dept: INTERNAL MEDICINE | Facility: CLINIC | Age: 84
End: 2019-10-08

## 2019-10-08 NOTE — TELEPHONE ENCOUNTER
Pt daughter informed me that the pt is unable to come to an appointment to have his paperwork completed for assisted living, so someone from hospice is going to come out to them to assess him and complete the necessary paperwork.

## 2019-10-08 NOTE — TELEPHONE ENCOUNTER
----- Message from Sydney Ramos sent at 10/8/2019  1:47 PM CDT -----  Contact: daughter  She's calling in regards to speak with nurse       pls call back at [phone] (home)

## 2019-10-15 ENCOUNTER — TELEPHONE (OUTPATIENT)
Dept: INTERNAL MEDICINE | Facility: CLINIC | Age: 84
End: 2019-10-15

## 2019-10-15 NOTE — TELEPHONE ENCOUNTER
----- Message from Rhianna Rodriguez sent at 10/15/2019 11:53 AM CDT -----  Contact: Omar/Abdoulaye of  [phone]  States that she is calling to let Dr Sigala know that pt  on 10/13/19 @ 5:19 pm. Please call back at [phone]//thank you acc

## 2020-10-06 ENCOUNTER — PATIENT MESSAGE (OUTPATIENT)
Dept: ADMINISTRATIVE | Facility: HOSPITAL | Age: 85
End: 2020-10-06

## 2020-11-19 ENCOUNTER — PES CALL (OUTPATIENT)
Dept: ADMINISTRATIVE | Facility: CLINIC | Age: 85
End: 2020-11-19

## 2021-07-26 ENCOUNTER — PATIENT OUTREACH (OUTPATIENT)
Dept: ADMINISTRATIVE | Facility: HOSPITAL | Age: 86
End: 2021-07-26